# Patient Record
Sex: MALE | Race: BLACK OR AFRICAN AMERICAN | Employment: OTHER | URBAN - METROPOLITAN AREA
[De-identification: names, ages, dates, MRNs, and addresses within clinical notes are randomized per-mention and may not be internally consistent; named-entity substitution may affect disease eponyms.]

---

## 2017-01-01 ENCOUNTER — HOSPITAL ENCOUNTER (INPATIENT)
Age: 82
LOS: 2 days | DRG: 871 | End: 2017-01-04
Attending: INTERNAL MEDICINE | Admitting: FAMILY MEDICINE
Payer: MEDICARE

## 2017-01-01 ENCOUNTER — APPOINTMENT (OUTPATIENT)
Dept: GENERAL RADIOLOGY | Age: 82
DRG: 871 | End: 2017-01-01
Attending: INTERNAL MEDICINE
Payer: MEDICARE

## 2017-01-01 VITALS
BODY MASS INDEX: 17.04 KG/M2 | DIASTOLIC BLOOD PRESSURE: 30 MMHG | OXYGEN SATURATION: 67 % | HEART RATE: 47 BPM | WEIGHT: 115.08 LBS | HEIGHT: 69 IN | RESPIRATION RATE: 21 BRPM | TEMPERATURE: 94.8 F | SYSTOLIC BLOOD PRESSURE: 60 MMHG

## 2017-01-01 DIAGNOSIS — I48.91 ATRIAL FIBRILLATION WITH RVR (HCC): Primary | ICD-10-CM

## 2017-01-01 DIAGNOSIS — I95.0 IDIOPATHIC HYPOTENSION: ICD-10-CM

## 2017-01-01 LAB
ALBUMIN SERPL BCP-MCNC: 2.9 G/DL (ref 3.4–5)
ALBUMIN/GLOB SERPL: 0.6 {RATIO} (ref 0.8–1.7)
ALP SERPL-CCNC: 217 U/L (ref 45–117)
ALT SERPL-CCNC: 27 U/L (ref 16–61)
AMORPH CRY URNS QL MICRO: ABNORMAL
ANION GAP BLD CALC-SCNC: 12 MMOL/L (ref 3–18)
ANION GAP BLD CALC-SCNC: 18 MMOL/L (ref 3–18)
APPEARANCE UR: ABNORMAL
APTT PPP: 33.3 SEC (ref 23–36.4)
APTT PPP: >180 SEC (ref 23–36.4)
AST SERPL W P-5'-P-CCNC: 33 U/L (ref 15–37)
ATRIAL RATE: 141 BPM
BACTERIA URNS QL MICRO: ABNORMAL /HPF
BASOPHILS # BLD AUTO: 0 K/UL (ref 0–0.06)
BASOPHILS # BLD AUTO: 0 K/UL (ref 0–0.06)
BASOPHILS # BLD: 0 % (ref 0–2)
BASOPHILS # BLD: 0 % (ref 0–2)
BILIRUB DIRECT SERPL-MCNC: 0.3 MG/DL (ref 0–0.2)
BILIRUB SERPL-MCNC: 0.7 MG/DL (ref 0.2–1)
BILIRUB UR QL: NEGATIVE
BUN SERPL-MCNC: 76 MG/DL (ref 7–18)
BUN SERPL-MCNC: 76 MG/DL (ref 7–18)
BUN/CREAT SERPL: 16 (ref 12–20)
BUN/CREAT SERPL: 16 (ref 12–20)
CALCIUM SERPL-MCNC: 10.9 MG/DL (ref 8.5–10.1)
CALCIUM SERPL-MCNC: 9.8 MG/DL (ref 8.5–10.1)
CALCULATED R AXIS, ECG10: 96 DEGREES
CALCULATED T AXIS, ECG11: -102 DEGREES
CHLORIDE SERPL-SCNC: 98 MMOL/L (ref 100–108)
CHLORIDE SERPL-SCNC: 99 MMOL/L (ref 100–108)
CK MB CFR SERPL CALC: 3.8 % (ref 0–4)
CK MB CFR SERPL CALC: 4.1 % (ref 0–4)
CK MB CFR SERPL CALC: 5.3 % (ref 0–4)
CK MB SERPL-MCNC: 3.8 NG/ML (ref 0.5–3.6)
CK MB SERPL-MCNC: 4 NG/ML (ref 0.5–3.6)
CK MB SERPL-MCNC: 6.7 NG/ML (ref 0.5–3.6)
CK SERPL-CCNC: 101 U/L (ref 39–308)
CK SERPL-CCNC: 126 U/L (ref 39–308)
CK SERPL-CCNC: 98 U/L (ref 39–308)
CO2 SERPL-SCNC: 21 MMOL/L (ref 21–32)
CO2 SERPL-SCNC: 28 MMOL/L (ref 21–32)
COLOR UR: ABNORMAL
CREAT SERPL-MCNC: 4.79 MG/DL (ref 0.6–1.3)
CREAT SERPL-MCNC: 4.81 MG/DL (ref 0.6–1.3)
DIAGNOSIS, 93000: NORMAL
DIFFERENTIAL METHOD BLD: ABNORMAL
DIFFERENTIAL METHOD BLD: ABNORMAL
EOSINOPHIL # BLD: 0 K/UL (ref 0–0.4)
EOSINOPHIL # BLD: 0 K/UL (ref 0–0.4)
EOSINOPHIL NFR BLD: 0 % (ref 0–5)
EOSINOPHIL NFR BLD: 0 % (ref 0–5)
EPITH CASTS URNS QL MICRO: ABNORMAL /LPF (ref 0–5)
ERYTHROCYTE [DISTWIDTH] IN BLOOD BY AUTOMATED COUNT: 14.6 % (ref 11.6–14.5)
ERYTHROCYTE [DISTWIDTH] IN BLOOD BY AUTOMATED COUNT: 14.8 % (ref 11.6–14.5)
GLOBULIN SER CALC-MCNC: 4.5 G/DL (ref 2–4)
GLUCOSE BLD STRIP.AUTO-MCNC: 113 MG/DL (ref 70–110)
GLUCOSE BLD STRIP.AUTO-MCNC: 122 MG/DL (ref 70–110)
GLUCOSE SERPL-MCNC: 100 MG/DL (ref 74–99)
GLUCOSE SERPL-MCNC: 90 MG/DL (ref 74–99)
GLUCOSE UR STRIP.AUTO-MCNC: NEGATIVE MG/DL
HCT VFR BLD AUTO: 26.6 % (ref 36–48)
HCT VFR BLD AUTO: 29.4 % (ref 36–48)
HGB BLD-MCNC: 10 G/DL (ref 13–16)
HGB BLD-MCNC: 9.1 G/DL (ref 13–16)
HGB UR QL STRIP: NEGATIVE
INR PPP: 1.4 (ref 0.8–1.2)
KETONES UR QL STRIP.AUTO: ABNORMAL MG/DL
LACTATE BLD-SCNC: 2.2 MMOL/L (ref 0.4–2)
LACTATE BLD-SCNC: 2.9 MMOL/L (ref 0.4–2)
LACTATE BLD-SCNC: 6.8 MMOL/L (ref 0.4–2)
LACTATE BLD-SCNC: 6.9 MMOL/L (ref 0.4–2)
LACTATE SERPL-SCNC: 12.5 MMOL/L (ref 0.4–2)
LACTATE SERPL-SCNC: 13.6 MMOL/L (ref 0.4–2)
LACTATE SERPL-SCNC: 9.4 MMOL/L (ref 0.4–2)
LEUKOCYTE ESTERASE UR QL STRIP.AUTO: ABNORMAL
LIPASE SERPL-CCNC: 185 U/L (ref 73–393)
LYMPHOCYTES # BLD AUTO: 14 % (ref 21–52)
LYMPHOCYTES # BLD AUTO: 8 % (ref 21–52)
LYMPHOCYTES # BLD: 0.7 K/UL (ref 0.9–3.6)
LYMPHOCYTES # BLD: 1 K/UL (ref 0.9–3.6)
MAGNESIUM SERPL-MCNC: 2.3 MG/DL (ref 1.8–2.4)
MAGNESIUM SERPL-MCNC: 2.5 MG/DL (ref 1.8–2.4)
MCH RBC QN AUTO: 32.1 PG (ref 24–34)
MCH RBC QN AUTO: 32.6 PG (ref 24–34)
MCHC RBC AUTO-ENTMCNC: 34 G/DL (ref 31–37)
MCHC RBC AUTO-ENTMCNC: 34.2 G/DL (ref 31–37)
MCV RBC AUTO: 94.2 FL (ref 74–97)
MCV RBC AUTO: 95.3 FL (ref 74–97)
MIXED CELL CASTS URNS QL MICRO: ABNORMAL /LPF
MONOCYTES # BLD: 0.5 K/UL (ref 0.05–1.2)
MONOCYTES # BLD: 0.5 K/UL (ref 0.05–1.2)
MONOCYTES NFR BLD AUTO: 5 % (ref 3–10)
MONOCYTES NFR BLD AUTO: 7 % (ref 3–10)
NEUTS SEG # BLD: 5.5 K/UL (ref 1.8–8)
NEUTS SEG # BLD: 7.6 K/UL (ref 1.8–8)
NEUTS SEG NFR BLD AUTO: 79 % (ref 40–73)
NEUTS SEG NFR BLD AUTO: 87 % (ref 40–73)
NITRITE UR QL STRIP.AUTO: NEGATIVE
PH UR STRIP: 5 [PH] (ref 5–8)
PLATELET # BLD AUTO: 223 K/UL (ref 135–420)
PLATELET # BLD AUTO: 229 K/UL (ref 135–420)
PMV BLD AUTO: 10.2 FL (ref 9.2–11.8)
PMV BLD AUTO: 9.7 FL (ref 9.2–11.8)
POTASSIUM SERPL-SCNC: 3.7 MMOL/L (ref 3.5–5.5)
POTASSIUM SERPL-SCNC: 3.9 MMOL/L (ref 3.5–5.5)
PROT SERPL-MCNC: 7.4 G/DL (ref 6.4–8.2)
PROT UR STRIP-MCNC: 100 MG/DL
PROTHROMBIN TIME: 16.7 SEC (ref 11.5–15.2)
Q-T INTERVAL, ECG07: 316 MS
QRS DURATION, ECG06: 100 MS
QTC CALCULATION (BEZET), ECG08: 492 MS
RBC # BLD AUTO: 2.79 M/UL (ref 4.7–5.5)
RBC # BLD AUTO: 3.12 M/UL (ref 4.7–5.5)
RBC #/AREA URNS HPF: NEGATIVE /HPF (ref 0–5)
SODIUM SERPL-SCNC: 138 MMOL/L (ref 136–145)
SODIUM SERPL-SCNC: 138 MMOL/L (ref 136–145)
SP GR UR REFRACTOMETRY: 1.02 (ref 1–1.03)
TROPONIN I SERPL-MCNC: 0.65 NG/ML (ref 0–0.04)
TROPONIN I SERPL-MCNC: 0.69 NG/ML (ref 0–0.04)
TROPONIN I SERPL-MCNC: 0.89 NG/ML (ref 0–0.04)
TSH SERPL DL<=0.05 MIU/L-ACNC: 4.66 UIU/ML (ref 0.36–3.74)
UROBILINOGEN UR QL STRIP.AUTO: 0.2 EU/DL (ref 0.2–1)
VENTRICULAR RATE, ECG03: 146 BPM
WBC # BLD AUTO: 7 K/UL (ref 4.6–13.2)
WBC # BLD AUTO: 8.7 K/UL (ref 4.6–13.2)
WBC URNS QL MICRO: ABNORMAL /HPF (ref 0–4)

## 2017-01-01 PROCEDURE — 81001 URINALYSIS AUTO W/SCOPE: CPT | Performed by: FAMILY MEDICINE

## 2017-01-01 PROCEDURE — 74011000250 HC RX REV CODE- 250: Performed by: FAMILY MEDICINE

## 2017-01-01 PROCEDURE — 93005 ELECTROCARDIOGRAM TRACING: CPT

## 2017-01-01 PROCEDURE — 80048 BASIC METABOLIC PNL TOTAL CA: CPT | Performed by: INTERNAL MEDICINE

## 2017-01-01 PROCEDURE — 74011250636 HC RX REV CODE- 250/636: Performed by: FAMILY MEDICINE

## 2017-01-01 PROCEDURE — 65610000006 HC RM INTENSIVE CARE

## 2017-01-01 PROCEDURE — 87086 URINE CULTURE/COLONY COUNT: CPT | Performed by: FAMILY MEDICINE

## 2017-01-01 PROCEDURE — 74011250636 HC RX REV CODE- 250/636: Performed by: HOSPITALIST

## 2017-01-01 PROCEDURE — 85730 THROMBOPLASTIN TIME PARTIAL: CPT | Performed by: PHYSICIAN ASSISTANT

## 2017-01-01 PROCEDURE — 71010 XR CHEST PORT: CPT

## 2017-01-01 PROCEDURE — 77030034850

## 2017-01-01 PROCEDURE — 83605 ASSAY OF LACTIC ACID: CPT

## 2017-01-01 PROCEDURE — 74011000258 HC RX REV CODE- 258: Performed by: FAMILY MEDICINE

## 2017-01-01 PROCEDURE — 99285 EMERGENCY DEPT VISIT HI MDM: CPT

## 2017-01-01 PROCEDURE — 96361 HYDRATE IV INFUSION ADD-ON: CPT

## 2017-01-01 PROCEDURE — 83735 ASSAY OF MAGNESIUM: CPT | Performed by: INTERNAL MEDICINE

## 2017-01-01 PROCEDURE — 85610 PROTHROMBIN TIME: CPT | Performed by: INTERNAL MEDICINE

## 2017-01-01 PROCEDURE — 93306 TTE W/DOPPLER COMPLETE: CPT

## 2017-01-01 PROCEDURE — 83605 ASSAY OF LACTIC ACID: CPT | Performed by: FAMILY MEDICINE

## 2017-01-01 PROCEDURE — L4396 STATIC OR DYNAMI AFO PRE CST: HCPCS

## 2017-01-01 PROCEDURE — 87040 BLOOD CULTURE FOR BACTERIA: CPT | Performed by: FAMILY MEDICINE

## 2017-01-01 PROCEDURE — 85730 THROMBOPLASTIN TIME PARTIAL: CPT | Performed by: INTERNAL MEDICINE

## 2017-01-01 PROCEDURE — 82550 ASSAY OF CK (CPK): CPT | Performed by: INTERNAL MEDICINE

## 2017-01-01 PROCEDURE — 36592 COLLECT BLOOD FROM PICC: CPT | Performed by: NURSE PRACTITIONER

## 2017-01-01 PROCEDURE — 92610 EVALUATE SWALLOWING FUNCTION: CPT

## 2017-01-01 PROCEDURE — 77030011256 HC DRSG MEPILEX <16IN NO BORD MOLN -A

## 2017-01-01 PROCEDURE — 51702 INSERT TEMP BLADDER CATH: CPT

## 2017-01-01 PROCEDURE — 74011000250 HC RX REV CODE- 250: Performed by: INTERNAL MEDICINE

## 2017-01-01 PROCEDURE — 74011250636 HC RX REV CODE- 250/636: Performed by: PHYSICIAN ASSISTANT

## 2017-01-01 PROCEDURE — 77030033263 HC DRSG MEPILEX 16-48IN BORD MOLN -B

## 2017-01-01 PROCEDURE — 87186 SC STD MICRODIL/AGAR DIL: CPT | Performed by: INTERNAL MEDICINE

## 2017-01-01 PROCEDURE — 87077 CULTURE AEROBIC IDENTIFY: CPT | Performed by: INTERNAL MEDICINE

## 2017-01-01 PROCEDURE — C9113 INJ PANTOPRAZOLE SODIUM, VIA: HCPCS | Performed by: FAMILY MEDICINE

## 2017-01-01 PROCEDURE — 83605 ASSAY OF LACTIC ACID: CPT | Performed by: HOSPITALIST

## 2017-01-01 PROCEDURE — 87040 BLOOD CULTURE FOR BACTERIA: CPT | Performed by: NURSE PRACTITIONER

## 2017-01-01 PROCEDURE — 83690 ASSAY OF LIPASE: CPT | Performed by: INTERNAL MEDICINE

## 2017-01-01 PROCEDURE — 74011000258 HC RX REV CODE- 258: Performed by: HOSPITALIST

## 2017-01-01 PROCEDURE — 76450000000

## 2017-01-01 PROCEDURE — 84443 ASSAY THYROID STIM HORMONE: CPT | Performed by: INTERNAL MEDICINE

## 2017-01-01 PROCEDURE — 96374 THER/PROPH/DIAG INJ IV PUSH: CPT

## 2017-01-01 PROCEDURE — 85025 COMPLETE CBC W/AUTO DIFF WBC: CPT | Performed by: INTERNAL MEDICINE

## 2017-01-01 PROCEDURE — 82962 GLUCOSE BLOOD TEST: CPT

## 2017-01-01 PROCEDURE — 80076 HEPATIC FUNCTION PANEL: CPT | Performed by: INTERNAL MEDICINE

## 2017-01-01 PROCEDURE — 77030022643 HC PAD DEFIB AD HRTSTRT PHL -B

## 2017-01-01 PROCEDURE — 77030013797 HC KT TRNSDUC PRSSR EDWD -A

## 2017-01-01 PROCEDURE — 74011250636 HC RX REV CODE- 250/636

## 2017-01-01 PROCEDURE — 84484 ASSAY OF TROPONIN QUANT: CPT | Performed by: INTERNAL MEDICINE

## 2017-01-01 PROCEDURE — 74011250636 HC RX REV CODE- 250/636: Performed by: INTERNAL MEDICINE

## 2017-01-01 PROCEDURE — 96375 TX/PRO/DX INJ NEW DRUG ADDON: CPT

## 2017-01-01 RX ORDER — CALCITRIOL 0.25 UG/1
0.25 CAPSULE ORAL DAILY
Status: DISCONTINUED | OUTPATIENT
Start: 2017-01-01 | End: 2017-01-01

## 2017-01-01 RX ORDER — AMIODARONE HYDROCHLORIDE 150 MG/3ML
150 INJECTION, SOLUTION INTRAVENOUS
Status: COMPLETED | OUTPATIENT
Start: 2017-01-01 | End: 2017-01-01

## 2017-01-01 RX ORDER — ALLOPURINOL 300 MG/1
300 TABLET ORAL DAILY
Status: DISCONTINUED | OUTPATIENT
Start: 2017-01-01 | End: 2017-01-01

## 2017-01-01 RX ORDER — AMIODARONE HYDROCHLORIDE 150 MG/3ML
150 INJECTION, SOLUTION INTRAVENOUS
Status: DISCONTINUED | OUTPATIENT
Start: 2017-01-01 | End: 2017-01-01

## 2017-01-01 RX ORDER — SODIUM CHLORIDE 0.9 % (FLUSH) 0.9 %
5-10 SYRINGE (ML) INJECTION AS NEEDED
Status: DISCONTINUED | OUTPATIENT
Start: 2017-01-01 | End: 2017-01-01 | Stop reason: HOSPADM

## 2017-01-01 RX ORDER — DILTIAZEM HYDROCHLORIDE 5 MG/ML
5 INJECTION INTRAVENOUS
Status: COMPLETED | OUTPATIENT
Start: 2017-01-01 | End: 2017-01-01

## 2017-01-01 RX ORDER — SODIUM CHLORIDE 0.9 % (FLUSH) 0.9 %
5-10 SYRINGE (ML) INJECTION EVERY 8 HOURS
Status: DISCONTINUED | OUTPATIENT
Start: 2017-01-01 | End: 2017-01-01 | Stop reason: HOSPADM

## 2017-01-01 RX ORDER — LEVOTHYROXINE SODIUM 25 UG/1
50 TABLET ORAL
Status: DISCONTINUED | OUTPATIENT
Start: 2017-01-01 | End: 2017-01-01 | Stop reason: HOSPADM

## 2017-01-01 RX ORDER — HEPARIN SODIUM 5000 [USP'U]/ML
5000 INJECTION, SOLUTION INTRAVENOUS; SUBCUTANEOUS EVERY 8 HOURS
Status: DISCONTINUED | OUTPATIENT
Start: 2017-01-01 | End: 2017-01-01

## 2017-01-01 RX ORDER — HEPARIN SODIUM 10000 [USP'U]/100ML
18 INJECTION, SOLUTION INTRAVENOUS
Status: DISCONTINUED | OUTPATIENT
Start: 2017-01-01 | End: 2017-01-01 | Stop reason: HOSPADM

## 2017-01-01 RX ORDER — NOREPINEPHRINE BITARTRATE/D5W 8 MG/250ML
2-30 PLASTIC BAG, INJECTION (ML) INTRAVENOUS
Status: DISCONTINUED | OUTPATIENT
Start: 2017-01-01 | End: 2017-01-01 | Stop reason: HOSPADM

## 2017-01-01 RX ORDER — HEPARIN SODIUM 1000 [USP'U]/ML
80 INJECTION, SOLUTION INTRAVENOUS; SUBCUTANEOUS ONCE
Status: COMPLETED | OUTPATIENT
Start: 2017-01-01 | End: 2017-01-01

## 2017-01-01 RX ORDER — LEVOFLOXACIN 5 MG/ML
500 INJECTION, SOLUTION INTRAVENOUS
Status: DISCONTINUED | OUTPATIENT
Start: 2017-01-01 | End: 2017-01-01 | Stop reason: HOSPADM

## 2017-01-01 RX ORDER — MELATONIN
3000 DAILY
Status: DISCONTINUED | OUTPATIENT
Start: 2017-01-01 | End: 2017-01-01

## 2017-01-01 RX ORDER — DILTIAZEM HYDROCHLORIDE 5 MG/ML
5 INJECTION INTRAVENOUS ONCE
Status: DISCONTINUED | OUTPATIENT
Start: 2017-01-01 | End: 2017-01-01

## 2017-01-01 RX ORDER — HEPARIN SODIUM 10000 [USP'U]/100ML
INJECTION, SOLUTION INTRAVENOUS
Status: COMPLETED
Start: 2017-01-01 | End: 2017-01-01

## 2017-01-01 RX ORDER — SODIUM CHLORIDE 9 MG/ML
250 INJECTION, SOLUTION INTRAVENOUS
Status: COMPLETED | OUTPATIENT
Start: 2017-01-01 | End: 2017-01-01

## 2017-01-01 RX ORDER — SIMVASTATIN 20 MG/1
40 TABLET, FILM COATED ORAL
Status: DISCONTINUED | OUTPATIENT
Start: 2017-01-01 | End: 2017-01-01 | Stop reason: HOSPADM

## 2017-01-01 RX ORDER — CINACALCET 30 MG/1
30 TABLET, FILM COATED ORAL DAILY
Status: DISCONTINUED | OUTPATIENT
Start: 2017-01-01 | End: 2017-01-01

## 2017-01-01 RX ADMIN — Medication 10 ML: at 10:06

## 2017-01-01 RX ADMIN — SODIUM CHLORIDE 40 MG: 9 INJECTION INTRAMUSCULAR; INTRAVENOUS; SUBCUTANEOUS at 14:13

## 2017-01-01 RX ADMIN — Medication 10 MCG/MIN: at 02:21

## 2017-01-01 RX ADMIN — Medication 10 ML: at 14:17

## 2017-01-01 RX ADMIN — PIPERACILLIN AND TAZOBACTAM 3.38 G: 3; .375 INJECTION, POWDER, FOR SOLUTION INTRAVENOUS at 02:11

## 2017-01-01 RX ADMIN — SODIUM CHLORIDE 500 ML: 900 INJECTION, SOLUTION INTRAVENOUS at 17:23

## 2017-01-01 RX ADMIN — DILTIAZEM HYDROCHLORIDE 5 MG: 5 INJECTION INTRAVENOUS at 18:28

## 2017-01-01 RX ADMIN — PIPERACILLIN AND TAZOBACTAM 3.38 G: 3; .375 INJECTION, POWDER, FOR SOLUTION INTRAVENOUS at 14:13

## 2017-01-01 RX ADMIN — HEPARIN SODIUM AND DEXTROSE 939.6 UNITS/HR: 10000; 5 INJECTION INTRAVENOUS at 11:40

## 2017-01-01 RX ADMIN — PIPERACILLIN SODIUM,TAZOBACTAM SODIUM 2.25 G: 2; .25 INJECTION, POWDER, FOR SOLUTION INTRAVENOUS at 08:23

## 2017-01-01 RX ADMIN — AMIODARONE HYDROCHLORIDE 0.5 MG/MIN: 1.8 INJECTION, SOLUTION INTRAVENOUS at 01:02

## 2017-01-01 RX ADMIN — LEVOFLOXACIN 500 MG: 5 INJECTION, SOLUTION INTRAVENOUS at 21:31

## 2017-01-01 RX ADMIN — HEPARIN SODIUM 4180 UNITS: 1000 INJECTION, SOLUTION INTRAVENOUS; SUBCUTANEOUS at 10:42

## 2017-01-01 RX ADMIN — PIPERACILLIN SODIUM,TAZOBACTAM SODIUM 2.25 G: 2; .25 INJECTION, POWDER, FOR SOLUTION INTRAVENOUS at 22:40

## 2017-01-01 RX ADMIN — Medication 2 MCG/MIN: at 21:11

## 2017-01-01 RX ADMIN — Medication 10 ML: at 01:02

## 2017-01-01 RX ADMIN — AMIODARONE HYDROCHLORIDE 1 MG/MIN: 1.8 INJECTION, SOLUTION INTRAVENOUS at 19:23

## 2017-01-01 RX ADMIN — AMIODARONE HYDROCHLORIDE 150 MG: 50 INJECTION, SOLUTION INTRAVENOUS at 19:06

## 2017-01-01 RX ADMIN — SODIUM CHLORIDE 1000 MG: 900 INJECTION, SOLUTION INTRAVENOUS at 22:11

## 2017-01-01 RX ADMIN — Medication 10 ML: at 10:05

## 2017-01-01 RX ADMIN — HEPARIN SODIUM 939.6 UNITS/HR: 10000 INJECTION, SOLUTION INTRAVENOUS at 11:40

## 2017-01-01 RX ADMIN — SODIUM CHLORIDE 1000 ML: 900 INJECTION, SOLUTION INTRAVENOUS at 18:28

## 2017-01-01 RX ADMIN — SODIUM CHLORIDE 250 ML: 900 INJECTION, SOLUTION INTRAVENOUS at 19:49

## 2017-01-01 RX ADMIN — AMIODARONE HYDROCHLORIDE 0.5 MG/MIN: 1.8 INJECTION, SOLUTION INTRAVENOUS at 01:39

## 2017-01-01 RX ADMIN — HEPARIN SODIUM 5000 UNITS: 5000 INJECTION, SOLUTION INTRAVENOUS; SUBCUTANEOUS at 08:21

## 2017-01-02 PROBLEM — I48.91 ATRIAL FIBRILLATION WITH RVR (HCC): Status: ACTIVE | Noted: 2017-01-01

## 2017-01-02 PROBLEM — A41.9 SEPSIS (HCC): Status: ACTIVE | Noted: 2017-01-01

## 2017-01-02 PROBLEM — I95.9 HYPOTENSION: Status: ACTIVE | Noted: 2017-01-01

## 2017-01-02 NOTE — ED NOTES
Blood pressure needs to be taken on the legs because patient has a fistula in the left arm and a PICC line in the right arm. Accurate BP readings have been difficult to obtain.  Also we have tried Pulse ox on both sided fingers and both sided ears (it is reading in and out)

## 2017-01-02 NOTE — ED NOTES
Spoke with Granddaughter on phone (also POA), Jaylan Garzon. Her number is [phone] and she would like to be called if anything changes. She confirmed that patient is a Full Code.

## 2017-01-02 NOTE — ED TRIAGE NOTES
Per EMS pt comes from dialysis because his blood pressure was to low to start his treatment. Pt denies any pain and per EMS BP in the  Field was 100/72.

## 2017-01-02 NOTE — ED PROVIDER NOTES
HPI Comments: 4:09 PM Kj Vides is a 80 y.o. male with a history of kidney disease and HTN who presents to ED c/o a decreased blood pressure. Pt has dementia and is a poor historian. Per the EMS, the pt is coming from the dialysis center because his blood pressure was to low to start his treatment and was brought to the ED for further evaluation. Per the RN at the dialysis center, the pt came for dialysis but before it was begun, he was noted to have a BP reading as 92/64, HR of 146, and being SOB. They called Dr Stanton Sweeney, who told them to bring him to the ER. Pt denies having any pain any where. No other concerns at this time. PCP: Marlo Rosenbaum MD        Patient is a 80 y.o. male presenting with other event. The history is provided by the patient. Other   Associated symptoms include abdominal pain and shortness of breath. Pertinent negatives include no chest pain and no headaches. Past Medical History:   Diagnosis Date    Acute gout     Blind right eye     Cardiomyopathy ([address]     Gout     HTN (hypertension)     Hyperlipemia     Kidney disease        Past Surgical History:   Procedure Laterality Date    Hx hernia repair           No family history on file. Social History     Social History    Marital status:      Spouse name: N/A    Number of children: N/A    Years of education: N/A     Occupational History    Not on file. Social History Main Topics    Smoking status: Former Smoker    Smokeless tobacco: Not on file    Alcohol use No    Drug use: No    Sexual activity: Not on file     Other Topics Concern    Not on file     Social History Narrative         ALLERGIES: Review of patient's allergies indicates no known allergies. Review of Systems   Constitutional: Negative for appetite change, chills, diaphoresis and fatigue. HENT: Negative for ear pain, facial swelling, hearing loss, nosebleeds, sneezing, sore throat and tinnitus.     Eyes: Negative for photophobia, pain, discharge, redness, itching and visual disturbance. Respiratory: Positive for shortness of breath. Negative for apnea, cough, choking, chest tightness, wheezing and stridor. Cardiovascular: Negative for chest pain, palpitations and leg swelling. Pt had a decreased BP. Gastrointestinal: Positive for abdominal pain. Negative for abdominal distention, anal bleeding, blood in stool, constipation, diarrhea, nausea, rectal pain and vomiting. Endocrine: Negative for heat intolerance, polydipsia, polyphagia and polyuria. Genitourinary: Negative for decreased urine volume, dysuria, enuresis, flank pain, frequency, genital sores and hematuria. Musculoskeletal: Negative for back pain, gait problem, joint swelling, myalgias and neck pain. Skin: Negative for color change, pallor, rash and wound. Allergic/Immunologic: Negative for environmental allergies and food allergies. Neurological: Negative for dizziness, tremors, syncope, facial asymmetry, speech difficulty, light-headedness, numbness and headaches. Hematological: Negative for adenopathy. Does not bruise/bleed easily. Psychiatric/Behavioral: Negative for decreased concentration, dysphoric mood, self-injury, sleep disturbance and suicidal ideas. The patient is not nervous/anxious and is not hyperactive. All other systems reviewed and are negative. Vitals:    01/02/17 1815 01/02/17 1819 01/02/17 1820 01/02/17 1830   BP:  130/78 105/62 108/61   Pulse: (!) 149 (!) 144 (!) 142 (!) 151   Resp: 21 19 29 23   Temp:       SpO2:       Weight:                Physical Exam   Constitutional: He is oriented to person, place, and time. He appears well-developed and well-nourished. HENT:   Head: Normocephalic. Mouth/Throat: No oropharyngeal exudate. Eyes: Pupils are equal, round, and reactive to light. Neck: Normal range of motion. Neck supple. Cardiovascular: Normal rate, normal heart sounds and intact distal pulses.   An irregularly irregular rhythm present. Exam reveals no gallop and no friction rub. No murmur heard. No JVD   Pulmonary/Chest: Effort normal. No respiratory distress. He has decreased breath sounds (at the bases). He has no wheezes. He has no rales. He exhibits no tenderness. Decreased breath sounds right base   Abdominal: Soft. Bowel sounds are normal. He exhibits no distension. There is no tenderness. There is no rebound and no guarding. Musculoskeletal: Normal range of motion. He exhibits no edema or tenderness. PICC line in right arm and dialysis fistula on left arm with thrill   Neurological: He is alert and oriented to person, place, and time. Skin: Skin is warm and dry. No rash noted. He is not diaphoretic. No erythema. Psychiatric: He has a normal mood and affect. Nursing note and vitals reviewed. [facility]  ED Course       Procedures    Vitals:  Patient Vitals for the past 12 hrs:   Temp Pulse Resp BP SpO2   01/02/17 1830 - (!) 151 23 108/61 -   01/02/17 1820 - (!) 142 29 105/62 -   01/02/17 1819 - (!) 144 19 130/78 -   01/02/17 1815 - (!) 149 21 - -   01/02/17 1745 - (!) 149 21 (!) 64/40 97 %   01/02/17 1735 - (!) 142 17 - 99 %   01/02/17 1730 - (!) 149 20 (!) 53/28 100 %   01/02/17 1715 98.1 °F (36.7 °C) - - - -   01/02/17 1700 - (!) 145 (!) 31 (!) 108/93 (!) 87 %   01/02/17 1628 - (!) 147 23 (!) 79/42 -   01/02/17 1600 - (!) 144 21 102/66 -   01/02/17 1559 - - - - 100 %   01/02/17 1553 96.8 °F (36 °C) (!) 144 24 108/75 -   01/02/17 1545 - - - 108/75 -   100% on RA, indicating adequate oxygenation.       Medications ordered:   Medications   sodium chloride 0.9 % bolus infusion 1,000 mL (1,000 mL IntraVENous New Bag 1/2/17 1828)   dilTIAZem (CARDIZEM) injection 5 mg (not administered)   amiodarone (NEXTERONE) 360 mg in dextrose 200 mL (1.8 mg/mL) infusion (not administered)   amiodarone (CORDARONE) injection 150 mg (not administered)   sodium chloride 0.9 % bolus infusion 500 mL (0 mL IntraVENous IV Completed 1/2/17 4254)   dilTIAZem (CARDIZEM) injection 5 mg (5 mg IntraVENous Given 1/2/17 2871)         Lab findings:  Recent Results (from the past 12 hour(s))   EKG, 12 LEAD, INITIAL    Collection Time: 01/02/17  4:29 PM   Result Value Ref Range    Ventricular Rate 146 BPM    Atrial Rate 141 BPM    QRS Duration 100 ms    Q-T Interval 316 ms    QTC Calculation (Bezet) 492 ms    Calculated R Axis 96 degrees    Calculated T Axis -102 degrees    Diagnosis       Undetermined rhythm  Rightward axis  Low voltage QRS  Septal infarct , age undetermined  ST & T wave abnormality, consider inferolateral ischemia  Abnormal ECG  When compared with ECG of 06-SEP-2016 15:38,  Current undetermined rhythm precludes rhythm comparison, needs review  Septal infarct is now present     PROTHROMBIN TIME + INR    Collection Time: 01/02/17  4:50 PM   Result Value Ref Range    Prothrombin time 16.7 (H) 11.5 - 15.2 sec    INR 1.4 (H) 0.8 - 1.2     PTT    Collection Time: 01/02/17  4:50 PM   Result Value Ref Range    aPTT 33.3 23.0 - 36.4 SEC   CBC WITH AUTOMATED DIFF    Collection Time: 01/02/17  4:50 PM   Result Value Ref Range    WBC 7.0 4.6 - 13.2 K/uL    RBC 3.12 (L) 4.70 - 5.50 M/uL    HGB 10.0 (L) 13.0 - 16.0 g/dL    HCT 29.4 (L) 36.0 - 48.0 %    MCV 94.2 74.0 - 97.0 FL    MCH 32.1 24.0 - 34.0 PG    MCHC 34.0 31.0 - 37.0 g/dL    RDW 14.6 (H) 11.6 - 14.5 %    PLATELET 222 114 - 177 K/uL    MPV 9.7 9.2 - 11.8 FL    NEUTROPHILS 79 (H) 40 - 73 %    LYMPHOCYTES 14 (L) 21 - 52 %    MONOCYTES 7 3 - 10 %    EOSINOPHILS 0 0 - 5 %    BASOPHILS 0 0 - 2 %    ABS. NEUTROPHILS 5.5 1.8 - 8.0 K/UL    ABS. LYMPHOCYTES 1.0 0.9 - 3.6 K/UL    ABS. MONOCYTES 0.5 0.05 - 1.2 K/UL    ABS. EOSINOPHILS 0.0 0.0 - 0.4 K/UL    ABS.  BASOPHILS 0.0 0.0 - 0.06 K/UL    DF AUTOMATED     METABOLIC PANEL, BASIC    Collection Time: 01/02/17  4:50 PM   Result Value Ref Range    Sodium 138 136 - 145 mmol/L    Potassium 3.9 3.5 - 5.5 mmol/L    Chloride 98 (L) 100 - 108 mmol/L    CO2 28 21 - 32 mmol/L    Anion gap 12 3.0 - 18 mmol/L    Glucose 90 74 - 99 mg/dL    BUN 76 (H) 7.0 - 18 MG/DL    Creatinine 4.79 (H) 0.6 - 1.3 MG/DL    BUN/Creatinine ratio 16 12 - 20      GFR est AA 14 (L) >60 ml/min/1.73m2    GFR est non-AA 11 (L) >60 ml/min/1.73m2    Calcium 10.9 (H) 8.5 - 10.1 MG/DL   CARDIAC PANEL,(CK, CKMB & TROPONIN)    Collection Time: 01/02/17  4:50 PM   Result Value Ref Range     39 - 308 U/L    CK - MB 3.8 (H) 0.5 - 3.6 ng/ml    CK-MB Index 3.8 0.0 - 4.0 %    Troponin-I, Qt. 0.65 (H) 0.0 - 0.045 NG/ML   LIPASE    Collection Time: 01/02/17  4:50 PM   Result Value Ref Range    Lipase 185 73 - 393 U/L   HEPATIC FUNCTION PANEL    Collection Time: 01/02/17  4:50 PM   Result Value Ref Range    Protein, total 7.4 6.4 - 8.2 g/dL    Albumin 2.9 (L) 3.4 - 5.0 g/dL    Globulin 4.5 (H) 2.0 - 4.0 g/dL    A-G Ratio 0.6 (L) 0.8 - 1.7      Bilirubin, total 0.7 0.2 - 1.0 MG/DL    Bilirubin, direct 0.3 (H) 0.0 - 0.2 MG/DL    Alk. phosphatase 217 (H) 45 - 117 U/L    AST 33 15 - 37 U/L    ALT 27 16 - 61 U/L   MAGNESIUM    Collection Time: 01/02/17  4:50 PM   Result Value Ref Range    Magnesium 2.5 (H) 1.8 - 2.4 mg/dL   POC LACTIC ACID    Collection Time: 01/02/17  4:52 PM   Result Value Ref Range    Lactic Acid (POC) 2.2 (HH) 0.4 - 2.0 mmol/L       EKG interpretation by ED Physician:    X-Ray, CT or other radiology findings or impressions:  XR CHEST PORT    (Final Result)  Independently interpreted by Christo Devine MD which shows: Heart failure, right sided pleural effusion. Progress notes, Consult notes or additional Procedure notes:   5:18 PM. Performed a bedside echo that showed poor heart activity. Consult:  Discussed care with Dr. Linette Nam, Cardiology Standard discussion; including history of patients chief complaint, available diagnostic results, and treatment course. Recommends pt admission.     7:05 PM Consult:  Discussed care with Dr. Alysia Wilhelm, hospitalist. Standard discussion; including history of patients chief complaint, available diagnostic results, and treatment course. Recommends I speak with cardiologist on-call for admission because he is not sure that amiodarone is indicated since it might convert him to nsr and throw embolus. 7:22 PM Case discussed with Dr Shila Beltre; cardiology; he recommends use of amiodarone also. Dr Michael Amaro called and informed; agrees with admission    CLINICAL IMPRESSION  CLINICAL IMPRESSION    1. Atrial fibrillation with RVR ([address]    2. Idiopathic hypotension        Disposition: admission    Follow-up Information     None           Patient's Medications   Start Taking    No medications on file   Continue Taking    ALLOPURINOL (ZYLOPRIM) 300 MG TABLET    300 mg. CALCITRIOL (ROCALTROL) 0.25 MCG CAPSULE    Take 0.25 mcg by mouth daily. CHOLECALCIFEROL, VITAMIN D3, 3,000 UNIT TAB    Take  by mouth. CINACALCET (SENSIPAR) 30 MG TABLET    Take 30 mg by mouth daily. LEVOTHYROXINE (SYNTHROID) 50 MCG TABLET    Take  by mouth Daily (before breakfast). SIMVASTATIN (ZOCOR) 40 MG TABLET    40 mg. These Medications have changed    No medications on file   Stop Taking    No medications on file         Scribe Attestation:   [employer] acting as a scribe for and in the presence of Dr. Akiko Herring MD January 02, 2017 at 4:06 PM     Signed by: [employer], Scribe, January 02, 2017, 4:06 PM    Provider Attestation:   I personally performed the services described in the documentation, reviewed the documentation, as recorded by the scribe in my presence, and it accurately and completely records my words and actions.      Reviewed and signed by:  Dr. Akiko Herring MD

## 2017-01-03 PROBLEM — E87.20 LACTIC ACIDOSIS: Status: ACTIVE | Noted: 2017-01-01

## 2017-01-03 PROBLEM — R57.9 SHOCK (HCC): Status: ACTIVE | Noted: 2017-01-01

## 2017-01-03 NOTE — ED NOTES
Patient repositioned and pulled up in bed. Purposeful rounding completed:    Side rails up x 2:  YES  Bed in low position and wheels locked: YES  Call bell within reach: YES  Comfort addressed: YES    Toileting needs addressed: YES  Plan of care reviewed/updated with patient and or family members: YES  IV site assessed: YES  Pain assessed and addressed: YES.

## 2017-01-03 NOTE — CONSULTS
Consult Note    Assessment:     1. ESRD. Moderately hypervolemic. Stable electrolytes. 2. Hypotension due to sepsis/a.fib/chf.   3. A. Fib with rvr. On amiodarone drip/heparin. 4. Systolic chf.   5. Bl feet gangrene with sepsis. Vascular on the case. 6. Lactic acidosis. 7. Anemia of ESRD. H/H is below goal.   6. Secondary hyperparathyroidism of ESRD. 7. Debilitation/malnutrtition. 8. Altered ms on likely baseline dementia. Recommendations:   1. Will hold off on dialysis given hemodynamic instability. Will likely dialyze tomorrow. 2. Continue pressors. 3. Continue empiric abx. 4. Epo with dialysis. 5. Hold sensipar and phos binder at this time. 6. Agree with plans for palliative care consult. 7. Avoid Gadolinium due to its association with nephrogenic systemic fibrosis in a patients with severe ARF and ESRD. 8. Please dose all medications for  creatinine clearance <15/dialysis. 9. Protect left  arm from blood pressure checks, blood draws, peripheral iv's. Consult requested by: Guillermina Laurent MD    ADMIT DATE: 1/2/2017  CONSULT DATE: January 3, 2017                 Admission diagnosis: Shock [facility])   Reason for Nephrology Consultation: Management of ESRD  HPI: Osiel Wright is a 80 y.o. male [address] with known diagnosis of htn, pad and  ESRD for which he receives hemodialysis on MWF  schedule at [facility]. Patient's nephrologist is Dr. Caterina Rico. Lt arm avg is used for dialysis access. Patient is debilitated and resides at [facility]. Patient presented to [facility] ED from dialysis unit due to hypotension and decrease in mental status. H/o is based on medical records and on talking to Dr. Caterina Rico. No dialysis was done yesterday. In ED patient was in A.fib with rvr. Had hypotension with cardizem drip, changed to amiodarone. Received ivf and was started on pressors, admitted to icu. He is on heparin drip for a. Fib and pvd with bl feet gangrene. Preliminary echo with low ef. Started on abx. Past Medical History   Diagnosis Date    Acute gout     Blind right eye     Cardiomyopathy ([address]     ESRD on hemodialysis (HCC)      Vicky greenwood Dr. Ilsa Shillings Gout     HTN (hypertension)     Hyperlipemia       Past Surgical History   Procedure Laterality Date    Hx hernia repair         Social History     Social History    Marital status:      Spouse name: N/A    Number of children: N/A    Years of education: N/A     Occupational History    Not on file. Social History Main Topics    Smoking status: Former Smoker    Smokeless tobacco: Not on file    Alcohol use No    Drug use: No    Sexual activity: Not on file     Other Topics Concern    Not on file     Social History Narrative       No family history on file. No Known Allergies     Home Medications:     Prescriptions Prior to Admission   Medication Sig    calcitRIOL (ROCALTROL) 0.25 mcg capsule Take 0.25 mcg by mouth daily.  Cholecalciferol, Vitamin D3, 3,000 unit tab Take  by mouth.  cinacalcet (SENSIPAR) 30 mg tablet Take 30 mg by mouth daily.  levothyroxine (SYNTHROID) 50 mcg tablet Take  by mouth Daily (before breakfast).  allopurinol (ZYLOPRIM) 300 mg tablet 300 mg.     simvastatin (ZOCOR) 40 mg tablet 40 mg.       Current Inpatient Medications:     Current Facility-Administered Medications   Medication Dose Route Frequency    sodium chloride (NS) flush 5-10 mL  5-10 mL IntraVENous Q8H    sodium chloride (NS) flush 5-10 mL  5-10 mL IntraVENous PRN    allopurinol (ZYLOPRIM) tablet 300 mg  300 mg Oral DAILY    cinacalcet (SENSIPAR) tablet 30 mg  30 mg Oral DAILY    [START ON 1/4/2017] levothyroxine (SYNTHROID) tablet 50 mcg  50 mcg Oral ACB    simvastatin (ZOCOR) tablet 40 mg  40 mg Oral QHS    calcitRIOL (ROCALTROL) capsule 0.25 mcg  0.25 mcg Oral DAILY    cholecalciferol (VITAMIN D3) tablet 3,000 Units  3,000 Units Oral DAILY    amiodarone (NEXTERONE) 360 mg in dextrose 200 mL (1.8 mg/mL) infusion  0.5-1 mg/min IntraVENous TITRATE    pantoprazole (PROTONIX) 40 mg in sodium chloride 0.9 % 10 mL injection  40 mg IntraVENous DAILY    heparin 25,000 units in D5W 250 ml infusion  18 Units/kg/hr IntraVENous TITRATE    piperacillin-tazobactam (ZOSYN) 2.25 g in 0.9% sodium chloride (MBP/ADV) 50 mL MBP  2.25 g IntraVENous Q8H    levoFLOXacin (LEVAQUIN) 500 mg in D5W IVPB  500 mg IntraVENous Q48H    NOREPINephrine (LEVOPHED) 8,000 mcg in dextrose 5% 250 mL infusion  2-30 mcg/min IntraVENous TITRATE       Review of Systems:   Unobtainable. Physical Assessment:     Vitals:    01/03/17 1015 01/03/17 1045 01/03/17 1100 01/03/17 1115   BP: 109/52 107/84 106/60 92/69   Pulse: (!) 111 (!) 111 (!) 114 (!) 111   Resp: 26 24 24 25   Temp:       SpO2:       Weight:       Height:         Last 3 Recorded Weights in this Encounter    01/02/17 1715 01/03/17 1000   Weight: 52.2 kg (115 lb) 52.2 kg (115 lb 1.3 oz)     Admission weight: Weight: 52.2 kg (115 lb) (01/02/17 1715)      Intake/Output Summary (Last 24 hours) at 01/03/17 1233  Last data filed at 01/03/17 1000   Gross per 24 hour   Intake           225.16 ml   Output                0 ml   Net           225.16 ml       Patient is in no apparent distress. HEENT: Head is normocephalic and atraumatic. Sclerae are anicteric. Oral mucosa is dry. Neck: no cervical lymphadenopathy or thyromegaly. Lungs: good air entry, bl exp rhonchi. Trachea at the midline. Cardiovascular system: S1, S2, irregular rate and rhythm. Soft syst murmur. Pos jvd. Carotid upstroke 1 + bilaterally. Abdomen: soft, non tender, non distended. Positive bowel sounds. No hepatosplenomegaly. No abdominal bruits. Extremities: no clubbing, cyanosis. 1+ bl pretibial edema. Feet are cold. 2nd toes bl are gangrenous. Bl heel dressings intact. Neurologic: Alert, states he is \"ok\", doesn't follow commands. Dialysis access: lt arm avg. Data Review:    Labs: Results:       Chemistry Recent Labs      01/03/17   0330  01/02/17   1650   GLU  100*  90   NA  138  138   K  3.7  3.9   CL  99*  98*   CO2  21  28   BUN  76*  76*   CREA  4.81*  4.79*   CA  9.8  10.9*   AGAP  18  12   BUCR  16  16   AP   --   217*   TP   --   7.4   ALB   --   2.9*   GLOB   --   4.5*   AGRAT   --   0.6*         CBC w/Diff Recent Labs      01/03/17   0330  01/02/17   1650   WBC  8.7  7.0   RBC  2.79*  3.12*   HGB  9.1*  10.0*   HCT  26.6*  29.4*   PLT  229  223   GRANS  87*  79*   LYMPH  8*  14*   EOS  0  0         Iron/Ferritin No results for input(s): IRON in the last 72 hours. No lab exists for component: TIBCCALC   PTH/VIT D No results for input(s): PTH in the last 72 hours.     No lab exists for component: VITD            Hortencia Krishnamurthy M.D  [facility]  Office [phone]  Pager [phone]    January 3, 2017

## 2017-01-03 NOTE — PROGRESS NOTES
Discussed with Dr. Jacqueline Ruiz. Presented to ER with A.fib/RVR at 150 bpm by report and given diltiazem but rate not controlled and SBP dropped to 50's, improved with IVF. ESRD limits additional agents and same thing likely to occur with beta-blocker.      -Plan amiodarone and monitoring on telemetry. Interestingly INR 1.4 despite not taking Coumadin by report but may offer some benefit if he converts.   -If no bleeding contraindications, reasonable to start heparin drip for both A.fib and indeterminate troponin. Trend enzymes.  -Need to exclude sepsis as trigger.    -Final consult to follow. Addendum 20:00:  EKG uploaded and cannot exclude sinus tachycardia vs atrial tachycardia or atrial fluter at least during EKG but clearly at risk for A.fib/flutter/tach. Continue amiodarone.

## 2017-01-03 NOTE — PROGRESS NOTES
PCCM #2    Regarding PICC Line. This appears to have been placed around 12/13/2016 at [facility]. It was not present in 9/2016 here. The site does not appear to be inflammed/ infected. He does not appear septic; is afebrile. VSS. The (+)bcx may be a contaminant from collection process. He is on 3 abx. RECOMMENDATION:  See order - rotate abx through the lumens; leave in situ.  Will follow.      -[facility]  [phone]

## 2017-01-03 NOTE — PROGRESS NOTES
completed the initial Spiritual Assessment of the patient in room 5 of the emergency room, and offered Pastoral Care support and prayer. No family seen atih patient at this time but patient is alert and talkative. Patient does not have any Confucianist/cultural needs that will affect patients preferences in health care.    Chaplains will continue to follow and will provide pastoral care on an as needed/requested basis     Chaplain Ashok Bryan   Board Certified [address] [place] [address]   [phone]

## 2017-01-03 NOTE — CDMP QUERY
Review of the documentation for this patient demonstrates the clinical indicators of a BMI of 17, height 5'9\" and weight 115 lbs. The medical record reflects the following clinical findings, treatment, and risk factors:    81 y/o adm with sepsis, septic shock and ESRD. On admission, weight 115 lbs, height 5'9\", BMI 17. Please clarify and document your clinical opinion in the progress notes and discharge summary including the definitive and/or presumptive diagnosis, (suspected or probable), related to the above clinical findings. Please include clinical findings supporting your diagnosis. If you DECLINE this query or would like to communicate with [facility], please utilize the \"[employer] message box\" at the TOP of the Progress Note on the right.       Thank you,    Cheyanne Morris -[phone]  [address]

## 2017-01-03 NOTE — ED NOTES
Purposeful rounding completed:    Side rails up x 2:  YES  Bed in low position and wheels locked: YES  Call bell within reach: YES  Comfort addressed: YES    Toileting needs addressed: YES  Plan of care reviewed/updated with patient and or family members: YES  IV site assessed: YES  Pain assessed and addressed: YES  Patient repositioned, lines untangled.

## 2017-01-03 NOTE — ED NOTES
Purposeful rounding completed:    Side rails up x 2:  YES  Bed in low position and wheels locked: YES  Call bell within reach: YES  Comfort addressed: YES    Toileting needs addressed: YES  Plan of care reviewed/updated with patient and or family members: YES  IV site assessed: YES  Pain assessed and addressed: YES.

## 2017-01-03 NOTE — PROGRESS NOTES
1882-3060: Patient transported to NSICU from the ED. Unable to get the room tele monitor to work properly. Patient staying on transport monitor until maintenance able to assess. Patient has no pulses below his both of his knees. His legs are colder than the rest of his body & he is missing his 2nd toenail on both his left & right foot. Axillary temp 96.4. Oral read not picking up. Rectal temp performed & 97.1    0902: Patient's tele monitor fixed and working properly. Unfortunately we are still unable to get spO2 on patient. Pulse ox tried on 7 different spots with no success. Warmed sites but still unable to get spO2 on patient. 9806-6339: Informed Dr. Andree Palma about critical lactic acid. Informed him of my assessment. Dr. Andree Palma seeing patient & stated he will order a vascular surgery consult and also a palliative care consult. [id number]: 2nd RN, Gaetano Mantilla, trying to get spO2 to  on patient. Multiple sites attempted. No success. 0656: Dr. Zoila Nguyen in the room with patient. 8547: Patient had a smear BM. 1000: Jaime Nguyen aware that unable to get reliable pulse ox on patient. 1006: POC glucose checked through the PICC. PICC flushed and wasted then obtained blood sample for POC. 1042: Heparin bolus given    1110: Called pharmacy about heparin drip. 3070-0525: 3rd RN, Jaiden Parra trying to get pulse ox to read. Multiple sites and warming attempted. No success. 1200: 97.2 rectal temp. 6680-3852: 4th RN, Pamela Salinas trying to get pulse ox to read. No success. 1300: Talked with wound care RNs about patient. Assisting wound care RNs.     1400: SLP in room seeing patient. 1410: Lactic acid drawn on patient. 1500: 5th RN, Jim Sampson trying to get pulse ox to read. Multiple sites attempted. No success. 1514: Patient having a more difficult time breathing. Still unable to get reliable pulse ox. Lung sounds unchanged which have been coarse.  Patient pulled up in bed again. NC increased from 2 L to 4 L. Paged Dr. Jackie Nicole    1600: Rectal temp 97.3    1629: Paged Dr. Jackie Nicole. 1630: Talked with Dr. Jackie Nicole     [id number], ICU PA ordered to do a blood culture from the patient's PICC. 1710: Aptt lab drawn    1715: Blood culture from PICC drawn     1720: Blood culture from PICC drawn. 1800: Patient had a smear BM. Cleaned patient. 1921: Called by chemistry & Lactic acid trending upward 12.5 this lactic acid was drawn @ 1410. Lactic acid now due which is why it wasn't done 4 hours after last one.     1922: Bedside and Verbal shift change report given to SANTINO Barrera (oncoming nurse) by Dionna Reynolds RN (offgoing nurse). Report included the following information SBAR, Kardex, ED Summary, OR Summary, Procedure Summary, Intake/Output, MAR, Accordion, Recent Results, Med Rec Status, Cardiac Rhythm A. Fib and Alarm Parameters .

## 2017-01-03 NOTE — PALLIATIVE CARE
Full note to follow     Patient seen, alerts to his name, not oriented, not able to participate in conversation. Discussed with niece who has been with patient since age 3 and her daughter. Mr. Henri Rivera wife  3 years ago, no biological children. All siblings have passed. Melissa is niece but raised by patient and wife since age 3. No other family active in care she is legal next of kin. Discussed current medical condition, they are aware of how ill patient is. Niece has decided on no CPR or intubation for any reason patient is DNR/DNI. Discussed current treatment, they wish to continue all current treatment including pressor support, heparin and option for HD tomorrow. We have discussed comfort care as an option and they wish to discuss.

## 2017-01-03 NOTE — PROGRESS NOTES
Medicine Progress Note    Patient: Hussein Russell   Age:  80 y.o.  DOA: 1/2/2017   Admit Dx / CC: Hypotension  Atrial fibrillation with RVR ([address]  LOS:  LOS: 1 day     Assessment/Plan   Principal Problem:    Shock ([address] (1/3/2017)    Active Problems:    Hypotension (1/2/2017)      Atrial fibrillation with RVR ([address] (1/2/2017)      Sepsis ([address] (1/2/2017)      Lactic acidosis (1/3/2017)        Additional Plan notes     1)  Shock- Likely septic at this point, less likely hypovolemic. No more fluids 2/2 ESRD currently. Continue on levophed, Intensivist on consult. Cause unknown i question the acuity of his Lower extremity ischemia this could be simply chronic as he has recent vascular studies from [facility] however given his cool extremity and lactic of 9.2 on a pressor feel its best to get vascular involved. I do see signs of dry gangrene but not wet at this time. 2)  ESRD- nephrology aware, spoke to dialysis nurse    3)  Lactic acid-  Possibly 2/2 ESRD but surely not entirely probably a source of infection , perhaps limb ischemia, continue broad abx. Careful with any fluid administration    4)  Afib RVR??, elevated trop Looks more sinus now-  On amio, cards consulted. Elevated trop probably from CKD. 5)  Acute encephalopathy-  Probably from infection      DISPO     Anticipated Date of Discharge: ? ??? Anticipated Disposition (home, SNF) : SNF    Subjective:   Patient seen and examined. Confused, awakens on sternal rub. Objective:     Visit Vitals    /62    Pulse (!) 115    Temp 97.4 °F (36.3 °C)    Resp 21    Ht 5' 9\" (1.753 m)    Wt 52.2 kg (115 lb)    SpO2 94%    BMI 16.98 kg/m2       Physical Exam:  General appearance: alert, cooperative, no distress, appears stated age  Head: Normocephalic, without obvious abnormality, atraumatic  Neck: supple, trachea midline  Lungs: clear to auscultation bilaterally  Heart: tachycardic  Abdomen: soft, non-tender.  Bowel sounds normal. No masses,  no organomegaly  Extremities: b/l cold LE, dry gangrene on b/l toes, area of malleolar ulceration on R with drainage. Skin: as above  Neurologic: Grossly normal, confused, non verbal, awakes to tactile stim.     Intake and Output:  Current Shift:     Last three shifts:       Lab/Data Reviewed:  CMP:   Lab Results   Component Value Date/Time     01/03/2017 03:30 AM    K 3.7 01/03/2017 03:30 AM    CL 99 (L) 01/03/2017 03:30 AM    CO2 21 01/03/2017 03:30 AM    AGAP 18 01/03/2017 03:30 AM     (H) 01/03/2017 03:30 AM    BUN 76 (H) 01/03/2017 03:30 AM    CREA 4.81 (H) 01/03/2017 03:30 AM    GFRAA 14 (L) 01/03/2017 03:30 AM    GFRNA 11 (L) 01/03/2017 03:30 AM    CA 9.8 01/03/2017 03:30 AM    MG 2.3 01/03/2017 03:30 AM    ALB 2.9 (L) 01/02/2017 04:50 PM    TP 7.4 01/02/2017 04:50 PM    GLOB 4.5 (H) 01/02/2017 04:50 PM    AGRAT 0.6 (L) 01/02/2017 04:50 PM    SGOT 33 01/02/2017 04:50 PM    ALT 27 01/02/2017 04:50 PM     CBC:   Lab Results   Component Value Date/Time    WBC 8.7 01/03/2017 03:30 AM    HGB 9.1 (L) 01/03/2017 03:30 AM    HCT 26.6 (L) 01/03/2017 03:30 AM     01/03/2017 03:30 AM     All Cardiac Markers in the last 24 hours:   Lab Results   Component Value Date/Time     01/03/2017 06:28 AM    CPK 98 01/02/2017 11:00 PM     01/02/2017 04:50 PM    CKMB 6.7 (H) 01/03/2017 06:28 AM    CKMB 4.0 (H) 01/02/2017 11:00 PM    CKMB 3.8 (H) 01/02/2017 04:50 PM    CKND1 5.3 (H) 01/03/2017 06:28 AM    CKND1 4.1 (H) 01/02/2017 11:00 PM    CKND1 3.8 01/02/2017 04:50 PM    TROIQ 0.89 (H) 01/03/2017 06:28 AM    TROIQ 0.69 (H) 01/02/2017 11:00 PM    TROIQ 0.65 (H) 01/02/2017 04:50 PM       Medications Reviewed:  Current Facility-Administered Medications   Medication Dose Route Frequency    sodium chloride (NS) flush 5-10 mL  5-10 mL IntraVENous Q8H    sodium chloride (NS) flush 5-10 mL  5-10 mL IntraVENous PRN    heparin (porcine) injection 5,000 Units  5,000 Units SubCUTAneous Q8H    amiodarone (NEXTERONE) 360 mg in dextrose 200 mL (1.8 mg/mL) infusion  0.5-1 mg/min IntraVENous TITRATE    piperacillin-tazobactam (ZOSYN) 2.25 g in 0.9% sodium chloride (MBP/ADV) 50 mL MBP  2.25 g IntraVENous Q8H    levoFLOXacin (LEVAQUIN) 500 mg in D5W IVPB  500 mg IntraVENous Q48H    NOREPINephrine (LEVOPHED) 8,000 mcg in dextrose 5% 250 mL infusion  2-30 mcg/min IntraVENous TITRATE       Elodia Monroe MD    January 3, 2017

## 2017-01-03 NOTE — PROGRESS NOTES
[facility]   Discharge Planning/ Assessment    Reasons for Intervention: Chart reviewed. Attempted to meet with pt., currently not stable, asked by nursing to check back later, states he won't be able to answer questions. Called NOK: Sammy Angelica, dtr, her  states she is at work but that I need to speak with Eldon Lemon, she handles pt's care. Called Eliza, no answer, left VM for her to call me back. Per chart pt goes to [facility] dialysis [address] Dr. Kowalski Roles is his PCP. Noted consult for palliative care. Discharge plan will depend on outcome of palliative care. Pt has been to SNF in the past.  Will cont to follow. Pat [address] [address].      High Risk Criteria  [x] Yes  []No   Physician Referral  [] Yes  [x]No        Date    Nursing Referral  [] Yes  [x]No        Date    Patient/Family Request  [] Yes  [x]No        Date       Resources:    Medicare  [x] Yes  []No   Medicaid  [] Yes  [x]No   No Resources  [] Yes  [x]No   Private Insurance  [x] Yes  []No    Name/Phone Number    Other  [] Yes  [x]No        (i.e. Workman's Comp)         Prior Services:    Prior Services  [x] Yes  []No   Home Health  [] Yes  [x]No   [address]  [] Yes  [x]No        Number of [address]  [] Yes  [x]No       Meals on Wheels  [] Yes  [x]No   Office on Aging  [] Yes  [x]No   Transportation Services  [] Yes  [x]No   Nursing Home  [] Yes  [x]No        Nursing Home Name    [address]  [] Yes  [x]No        [address] Name    Other       Information Source:      Information obtained from  [] Patient  [] Parent   [] [address]  [] Child  [] Spouse   [] Significant Other/Partner   [] Friend      [] EMS    [] Nursing Home Chart          [] Other:   Chart Review  [x] Yes  []No     Family/Support System:    Patient lives with  [] Alone    [] Spouse   [] Significant Other  [] Children  [] Caretaker   [] Parent  [] Sibling     [] Other       Other Support System:    Is the patient responsible for care of others  [] Yes  [x]No   Information of person caring for patient on  discharge    Managers financial affairs independently  [] Yes  [x]No   If no, explain:      Status Prior to Admission:    Mental Status  [] Awake  [] Alert  [] Oriented  [] Quiet/Calm [] Lethargic/Sedated   [] Disoriented  [] Restless/Anxious  [] Combative   Personal Care  [x] Dependent  [] [address]  [] Requires Assistance   Meal Preparation Ability  [] Independent   [] Standby Assistance   [] Minimal Assistance   [] Moderate Assistance  [] Maximum Assistance     [x] Total Assistance   Chores  [] Independent with Chores   [] N/A Nursing Home Resident   [x] Requires Assistance   Bowel/Bladder  [] Continent  [] Catheter  [x] Incontinent  [] Ostomy Self-Care    [] Urine Diversion Self-Care  [] Maximum Assistance     [] Total Assistance   Number of Persons needed for assistance    DME at home  [] Cris Leslie  [] Dulce Leslie   [] Commode    [] Bathroom/Grab Bars  [] Hospital Bed  [] Nebulizer  [] Oxygen           [] Raised Toilet Seat  [] Shower Chair  [] Side Rails for Bed   [] Tub Transfer Bench   [] Alesha Mirna  [] Toni Ace, Standard      [] Other:   Vendor      Treatment Presently Receiving:    Current Treatments  [] Chemotherapy  [x] Dialysis  [] Insulin  [] IVAB [x] IVF   [x] O2  [] PCA   [] PT   [] RT   [] Tube Feedings   [] Wound Care     Psychosocial Evaluation:    Verbalized Knowledge of Disease Process  [] Patient  []Family   Coping with Disease Process  [] Patient  []Family   Requires Further Counseling Coping with Disease Process  [] Patient  []Family     Identified Projected Needs:    Home Health Aid  [] Yes  [x]No   Transportation  [] Yes  [x]No   Education  [] Yes  [x]No        Specific Education     Financial Counseling  [] Yes  [x]No   Inability to Care for Self/Will Require 24 hour care  [] Yes  [x]No   Pain Management  [] Yes  [x]No   Home Infusion Therapy [] Yes  [x]No   Oxygen Therapy  [] Yes  [x]No   DME  [] Yes  [x]No   Long Term Care Placement  [] Yes  [x]No   Rehab  [] Yes  [x]No   Physical Therapy  [] Yes  [x]No   Needs Anticipated At This Time  [x] Yes  []No     Intra-Hospital Referral:    [facility]  [] Yes  [x]No     [] Yes  [x]No   Patient Representative  [] Yes  [x]No   Staff for Teaching Needs  [] Yes  [x]No   Specialty Teaching Needs     Diabetic Educator  [] Yes  [x]No   Referral for Diabetic Educator Needed  [] Yes  [x]No  If Yes, place order for Nutritionist or Diabetic Consult     Tentative Discharge Plan:    Home with No Services  [] Yes  [x]No   Home with [address]  [] Yes  [x]No        If Yes, specify type    Home Care Program  [] Yes  [x]No        If Yes, specify type    Meals on Wheels  [] Yes  [x]No   Office of Aging  [] Yes  [x]No   NHP  [] Yes  [x]No   Return to the Nursing Home  [] Yes  [x]No   Rehab Therapy  [] Yes  [x]No   Acute Rehab  [] Yes  [x]No   Subacute Rehab  [] Yes  [x]No   Private Care  [] Yes  [x]No   Substance Abuse Referral  [] Yes  [x]No   Transportation  [] Yes  [x]No   Chore Service  [] Yes  [x]No   Inpatient Hospice  [] Yes  [x]No   OP RT  [] Yes  [x] No   OP Hemo  [] Yes  [x] No   OP PT  [] Yes  [x]No   Support Group  [] Yes  [x]No   Reach to Recovery  [] Yes  [x]No   OP Oncology Clinic  [] Yes  [x]No   Clinic Appointment  [] Yes  [x]No   DME  [] Yes  [x]No   Comments    Name of D/C Planner or  Given to Patient or Family Mani Record   Phone Number Pager: [phone]        Extension Ext. [phone]  [phone]   Date 1-3-2017   Time    If you are discharged home, whom do you designate to participate in your discharge plan and receive any information needed? Enter name of Terrence Kline / Stephenie Harris        Phone # of designee [phone] / [phone].         Address of designee         Updated         Patient refused to designate any           individual

## 2017-01-03 NOTE — DIABETES MGMT
NUTRITIONAL ASSESSMENT AND PLAN OF CARE     Murray Hardin           80 y.o.           1/2/2017                 1. Atrial fibrillation with RVR ([address]    2. Idiopathic hypotension         [x]  No Cultural, Sikhism or ethnic dietary need identified. []  Cultural, Sikhism and ethnic food preferences identified and addressed    []  Participated in discharge planning/Interdisciplinary rounds   Food allergies: [x]  No        []  Yes-  ASSESSMENT:   Pt is 72% ideal wt; BMI (calculated): 17.0 kg/m2 (underweight). Appears thin. Notes indicate pt with bilateral lower extremity ischemia and gangrene. Pt awake this morning but appears fatigued and no po taken, awaiting SLP recommendations. Unable to access [facility] records to determine diet at [facility]. INTERVENTIONS/PLAN:   Monitor feeding status, weights and labs. SUBJECTIVE/OBJECTIVE:   Information obtained from: chart review, RN;  Pt not able to provide diet/weight history. [facility] but computer does not bring them up (tried on 2  computers). Admission to [facility] on 12/13/16 indicates pt was wheelchair bound at [facility]. Diet: NPO   No data found.     Medications: [x]                Reviewed     Most Recent POC Glucose:   Recent Labs      01/03/17   0330  01/02/17   1650   GLU  100*  90         Labs:   Lab Results   Component Value Date/Time    Hemoglobin A1c 5.1 01/18/2015 05:30 AM     Lab Results   Component Value Date/Time    Sodium 138 01/03/2017 03:30 AM    Potassium 3.7 01/03/2017 03:30 AM    Chloride 99 01/03/2017 03:30 AM    CO2 21 01/03/2017 03:30 AM    Anion gap 18 01/03/2017 03:30 AM    Glucose 100 01/03/2017 03:30 AM    BUN 76 01/03/2017 03:30 AM    Creatinine 4.81 01/03/2017 03:30 AM    Calcium 9.8 01/03/2017 03:30 AM    Magnesium 2.3 01/03/2017 03:30 AM    Phosphorus 4.6 09/08/2016 09:33 AM    Albumin 2.9 01/02/2017 04:50 PM       Anthropometrics: IBW : 72.6 kg (160 lb), % IBW (Calculated): 71.92 %, BMI (calculated): 17  Wt Readings from Last 1 Encounters:   01/03/17 52.2 kg (115 lb 1.3 oz)    9-11-16 weight from previous admission at [facility] - 55 kg  12-13-16 weight from Chart Everywhere/admission to [facility] - 44 kg  Ht Readings from Last 1 Encounters:   01/03/17 5' 9\" (1.753 m)       Estimated Nutrition Needs: 1827 Kcals/day, Protein (g): 63 g Fluid (ml):  (1 L + urine output)  Based on:   [x]          Actual BW    []          IBW   []            Adjusted BW        Nutrition Diagnoses:   Underweight due to inadequate energy intake as evidenced by BMI of 17.0 kg/m2 and pt is 72% ideal wt. Altered nutrition related labs due to ESRD as evidenced by GFR of 14. Inadequate oral food and beverage intake due to medical condition/weakness as evidenced by NPO orders and SLP consult pending  Nutrition Interventions:  None at this time - NPO  Goal:   Provision of adequate nutrition by 1/8/17. Weight maintenance (+/- 1-2 kg) or weight gain of 1-2# per week by 1/13/17. Renal labs at baseline by 1/8/17.           Nutrition Monitoring and Evaluation      []     Monitor po intake on meal rounds  [x]     Continue inpatient monitoring and intervention  [x]     Other: monitor feeding status    Nutrition Risk:  [x]   High     []  Moderate    []  Minimal/Uncompromised    Ami Amaya RD   LR pager [phone]

## 2017-01-03 NOTE — PROGRESS NOTES
Problem: Dysphagia (Adult)  Goal: *Acute Goals and Plan of Care (Insert Text)  Dysphagia Present: mild-mod  Aspiration: at risk     Recommendations:  Diet: puree  Meds: crushed  Aspiration Precautions  Oral Care TID=    Goals: Patient will:  1. Tolerate PO trials with 0 s/s overt distress in 4/5 trials  2. Utilize compensatory swallow strategies/maneuvers (decrease bite/sip, size/rate, alt. liq/sol) with min cues in 4/5 trials  3. Complete an objective swallow study (i.e., MBSS) to assess swallow integrity, r/o aspiration, and determine of safest LRD, min A  SPEECH LANGUAGE PATHOLOGY BEDSIDE SWALLOW EVALUATION     Patient: Tali Motta (18 y.o. male)  Date: 1/3/2017  Primary Diagnosis: Hypotension  Atrial fibrillation with RVR ([facility])        Precautions: aspiration         ASSESSMENT :  Based on the objective data described below, the patient presents with mild-mod OP dysphagia in the setting of AMS. Pt drowsy; alerts to stim; falls asleep quickly. Family at b/s endorsing regular solid diet at [facility]. Has dentures but are not here. Pt accepted serial swallows of thin liquid + straw without aspiration s/s. Applesauce accepted with incoordinated oral bolus prep; no aspiration s/s. Attempted cracker; pt unable to manipulate and began to fall asleep. SLP removed cracker. Recommend initiation of puree diet (only when alert). Educated family on aspiration precautions and importance of compensatory swallow techniques to decrease aspiration risk (decrease rate of intake & sip/bite size, upright @HOB for all po intake and ~30 minutes after po); verbalized comprehension. Patient will benefit from skilled intervention to address the above impairments.   Patients rehabilitation potential is considered to be Fair  Factors which may influence rehabilitation potential include:   [ ]            None noted  [X]            Mental ability/status  [X]            Medical condition  [ ]            Home/family situation and support systems  [ ]            Safety awareness  [ ]            Pain tolerance/management  [ ]            Other:        PLAN :  Recommendations and Planned Interventions:  puree  Frequency/Duration: Patient will be followed by speech-language pathology 1-2 times per day/4-7 days per week to address goals. Discharge Recommendations: Skilled Nursing Facility       SUBJECTIVE:   Family stated thanks so much\". OBJECTIVE:       Past Medical History   Diagnosis Date    Acute gout      Blind right eye      Cardiomyopathy ([address]      ESRD on hemodialysis (HCC)         [address] Dr. Leellen Reel Gout      HTN (hypertension)      Hyperlipemia       Past Surgical History   Procedure Laterality Date    Hx hernia repair         Prior Level of Function/Home Situation: SNF     Diet prior to admission: regular  Current Diet:  puree   Cognitive and Communication Status:  Neurologic State: Drowsy, Eyes open to stimulus  Orientation Level: Oriented to person, Unable to verbalize  Cognition: Decreased attention/concentration, Decreased command following  Perception: Cues to maintain midline in sitting     Safety/Judgement: Fall prevention  Oral Assessment:  Oral Assessment  Labial: Decreased rate;Decreased seal  Dentition: Edentulous  Oral Hygiene: good  Lingual: Decreased rate;Decreased strength  Velum: Unable to visualize  Mandible: No impairment  P.O. Trials:  Patient Position: [facility]  Vocal quality prior to P.O.: Low volume  Consistency Presented: Thin liquid;Pudding;Mechanical soft  How Presented: SLP-fed/presented;Straw;Successive swallows;Spoon     Bolus Acceptance: No impairment  Bolus Formation/Control: Impaired  Type of Impairment: Delayed;Mastication;Poor  Propulsion: Delayed (# of seconds); Discoordination  Oral Residue: Less than 10% of bolus; Lingual  Initiation of Swallow: Delayed (# of seconds)  Laryngeal Elevation: Functional  Aspiration Signs/Symptoms: None  Pharyngeal Phase Characteristics: Poor endurance  Effective Modifications: Small sips and bites  Cues for Modifications: Moderate        Oral Phase Severity: Moderate  Pharyngeal Phase Severity : Mild     GCODESwallowing:  Swallow Current Status CJ= 20-39%   Swallow Goal Status CI= 1-19%     The severity rating is based on the following outcomes:  IGNACIO Noms Swallow Level 5              Clinical Judgement     PAIN:  Start of Eval: 0  End of Eval: 0      After treatment:   [ ]            Patient left in no apparent distress sitting up in chair  [X]            Patient left in no apparent distress in bed  [X]            Call bell left within reach  [X]            Nursing notified  [X]            Family present  [ ]            Caregiver present  [ ]            Bed alarm activated      COMMUNICATION/EDUCATION:   [X]            Aspiration precautions; swallow safety; compensatory techniques. [X]            Patient/family have participated as able in goal setting and plan of care. [ ]            Patient/family agree to work toward stated goals and plan of care. [ ]            Patient understands intent and goals of therapy; neutral about participation. [X]            Patient unable to participate in goal setting/plan of care; educ ongoing with family and interdisciplinary staff  [ ]             Posted safety precautions in patient's room.      Thank you for this referral.  Balbir Parkinson, SLP  Time Calculation: 25 mins

## 2017-01-03 NOTE — WOUND CARE
Patient's nurse, Kelly Maya, SANTINO requested that wound care give recommendations for a small open area on patient's right ankle. Patient also has multiple gangrenous toes that he is being followed by vascular and internal medicine for treatment. Wound care recommends Aquacel Ag to right lateral malleolus covered by bordered foam to be changed every three days. Discussed with Dr. León Pereira and orders received.

## 2017-01-03 NOTE — CONSULTS
D/w Dr. Jesica Hackett, following information taken from him and EMR review. Patient with HTN, ESRD on HD admitted from HD center for hypotension in sbp 70's, received 250 ml bolus in er. Also found to have afib with rvr, received cardizem bolus leading to hypotension, followed by started on amio drip after bolus per cardiologist. Mildly elevated trop but no symptoms of angina. No fever or leucocytosis. Chronic pleural effusion on cxr. Mildly elevated lactate 2.2 but no sing of infection. Hx of HTN but his sbp was also noted to be in 100's in previous hospitalization, and he is not on anty anti-htn meds. His last sbp in low 100's. I have requested central line to be placed in ER. Will consider starting levophed if required. Should get echo for LVEF eval. Patient is not making any urine. Requested to order blood cx sputum cx and start broad spectrum abx vanco zosyn levaquin for now and quickly narrow it down. On cxr, there appears to have right UE PICC line. Will order blood cx through picc line also. Patient is full code. Full consult to follow in am. Call us with any questions to night.      Ivory Mackenzie MD 1/2/2017 8:53 PM

## 2017-01-03 NOTE — PROGRESS NOTES
Spoke to [employer] in lab, blood cultures were d/c by , reordered blood cultures one set to be drawn from PICC line and patient is to have 2 peripheral sets drawn.  Raphael Cheek RN in ED aware

## 2017-01-03 NOTE — ACP (ADVANCE CARE PLANNING)
Patient is unable to designate anyone  to participate in his/her discharge plan and to receive any needed information due to medical issues & mental status. NOK: Daughter & grand-dtr. Name:  Cherrie Trujillo  Address:  Phone number:  [phone] / [phone].

## 2017-01-03 NOTE — H&P
History and Physical    Patient: Kelton Real               Sex: male          DOA: 1/2/2017       YOB: 1923      Age:  80 y.o.        LOS:  LOS: 0 days        Chief Complaint   Patient presents with    Hypotension         HPI:     Kelton Real is a 80 y.o. male who presented in ED for evaluation of hypotension. Per ED physician patient was at the dialysis center today when it was noted he was hypotensive. He had not yet had the dialysis. In the ED he was noted to have tachycardia and hypotension systolic in the low 24'F. He was was initially given IV NS 1500 cc total. He also was given bolus Cardizem and subsequently switched to amiodarone bolus and then infusion on the recommendation of cardiology. Troponin is elevated. Patient also had elevated lactic acid. Patient was not started on antibiotics. Patient will be admitted for further treatment    Past Medical History   Diagnosis Date    Acute gout     Blind right eye     Cardiomyopathy ([facility] [address]     Gout     HTN (hypertension)     Hyperlipemia     Kidney disease         Past Surgical History   Procedure Laterality Date    Hx hernia repair         No current facility-administered medications on file prior to encounter. Current Outpatient Prescriptions on File Prior to Encounter   Medication Sig Dispense Refill    calcitRIOL (ROCALTROL) 0.25 mcg capsule Take 0.25 mcg by mouth daily.  Cholecalciferol, Vitamin D3, 3,000 unit tab Take  by mouth.  cinacalcet (SENSIPAR) 30 mg tablet Take 30 mg by mouth daily.  levothyroxine (SYNTHROID) 50 mcg tablet Take  by mouth Daily (before breakfast).  allopurinol (ZYLOPRIM) 300 mg tablet 300 mg.  simvastatin (ZOCOR) 40 mg tablet 40 mg.            Social History:   Tobacco use: unknown   Alcohol use: unknown   Occupation: retired    Family History: none      Review of Systems: Unable to obtain due to acuity            Physical Exam:      Visit Vitals    /59    Pulse (!) 134    Temp 98.1 °F (36.7 °C)    Resp 22    Ht 5' 9\" (1.753 m)    Wt 52.2 kg (115 lb)    SpO2 97%    BMI 16.98 kg/m2       Physical Exam:    Gen:  No distress, lethargic   HEENT:  Normal cephalic atraumatic, extra-occular movements are intact. Neck:  Supple, No JVD  Lungs:  Clear bilaterally, no wheeze, no rales, normal effort  Heart:  Regular Rate and Rhythm, normal S1 and S2, no edema  Abdomen:  Soft, non tender, normal bowel sounds, no guarding.   Extremities:  Well perfused, no cyanosis or edema  Neurological:  Awake and alert, CN's are intact, normal strength throughout extremities  Skin:  No rashes or moles, capp refill < 3 sec  Mental Status: non responsive possibly at baseline     Laboratory Studies:    Recent Results (from the past 24 hour(s))   EKG, 12 LEAD, INITIAL    Collection Time: 01/02/17  4:29 PM   Result Value Ref Range    Ventricular Rate 146 BPM    Atrial Rate 141 BPM    QRS Duration 100 ms    Q-T Interval 316 ms    QTC Calculation (Bezet) 492 ms    Calculated R Axis 96 degrees    Calculated T Axis -102 degrees    Diagnosis       Undetermined rhythm  Rightward axis  Low voltage QRS  Septal infarct , age undetermined  ST & T wave abnormality, consider inferolateral ischemia  Abnormal ECG  When compared with ECG of 06-SEP-2016 15:38,  Current undetermined rhythm precludes rhythm comparison, needs review  Septal infarct is now present     PROTHROMBIN TIME + INR    Collection Time: 01/02/17  4:50 PM   Result Value Ref Range    Prothrombin time 16.7 (H) 11.5 - 15.2 sec    INR 1.4 (H) 0.8 - 1.2     PTT    Collection Time: 01/02/17  4:50 PM   Result Value Ref Range    aPTT 33.3 23.0 - 36.4 SEC   CBC WITH AUTOMATED DIFF    Collection Time: 01/02/17  4:50 PM   Result Value Ref Range    WBC 7.0 4.6 - 13.2 K/uL    RBC 3.12 (L) 4.70 - 5.50 M/uL    HGB 10.0 (L) 13.0 - 16.0 g/dL    HCT 29.4 (L) 36.0 - 48.0 %    MCV 94.2 74.0 - 97.0 FL    MCH 32.1 24.0 - 34.0 PG    MCHC 34.0 31.0 - 37.0 g/dL    RDW 14.6 (H) 11.6 - 14.5 %    PLATELET 076 645 - 155 K/uL    MPV 9.7 9.2 - 11.8 FL    NEUTROPHILS 79 (H) 40 - 73 %    LYMPHOCYTES 14 (L) 21 - 52 %    MONOCYTES 7 3 - 10 %    EOSINOPHILS 0 0 - 5 %    BASOPHILS 0 0 - 2 %    ABS. NEUTROPHILS 5.5 1.8 - 8.0 K/UL    ABS. LYMPHOCYTES 1.0 0.9 - 3.6 K/UL    ABS. MONOCYTES 0.5 0.05 - 1.2 K/UL    ABS. EOSINOPHILS 0.0 0.0 - 0.4 K/UL    ABS. BASOPHILS 0.0 0.0 - 0.06 K/UL    DF AUTOMATED     METABOLIC PANEL, BASIC    Collection Time: 01/02/17  4:50 PM   Result Value Ref Range    Sodium 138 136 - 145 mmol/L    Potassium 3.9 3.5 - 5.5 mmol/L    Chloride 98 (L) 100 - 108 mmol/L    CO2 28 21 - 32 mmol/L    Anion gap 12 3.0 - 18 mmol/L    Glucose 90 74 - 99 mg/dL    BUN 76 (H) 7.0 - 18 MG/DL    Creatinine 4.79 (H) 0.6 - 1.3 MG/DL    BUN/Creatinine ratio 16 12 - 20      GFR est AA 14 (L) >60 ml/min/1.73m2    GFR est non-AA 11 (L) >60 ml/min/1.73m2    Calcium 10.9 (H) 8.5 - 10.1 MG/DL   CARDIAC PANEL,(CK, CKMB & TROPONIN)    Collection Time: 01/02/17  4:50 PM   Result Value Ref Range     39 - 308 U/L    CK - MB 3.8 (H) 0.5 - 3.6 ng/ml    CK-MB Index 3.8 0.0 - 4.0 %    Troponin-I, Qt. 0.65 (H) 0.0 - 0.045 NG/ML   LIPASE    Collection Time: 01/02/17  4:50 PM   Result Value Ref Range    Lipase 185 73 - 393 U/L   HEPATIC FUNCTION PANEL    Collection Time: 01/02/17  4:50 PM   Result Value Ref Range    Protein, total 7.4 6.4 - 8.2 g/dL    Albumin 2.9 (L) 3.4 - 5.0 g/dL    Globulin 4.5 (H) 2.0 - 4.0 g/dL    A-G Ratio 0.6 (L) 0.8 - 1.7      Bilirubin, total 0.7 0.2 - 1.0 MG/DL    Bilirubin, direct 0.3 (H) 0.0 - 0.2 MG/DL    Alk.  phosphatase 217 (H) 45 - 117 U/L    AST 33 15 - 37 U/L    ALT 27 16 - 61 U/L   MAGNESIUM    Collection Time: 01/02/17  4:50 PM   Result Value Ref Range    Magnesium 2.5 (H) 1.8 - 2.4 mg/dL   POC LACTIC ACID    Collection Time: 01/02/17  4:52 PM   Result Value Ref Range    Lactic Acid (POC) 2.2 (HH) 0.4 - 2.0 mmol/L   POC LACTIC ACID    Collection Time: 01/02/17  9:25 PM Result Value Ref Range    Lactic Acid (POC) 2.9 (HH) 0.4 - 2.0 mmol/L     CXRp - bilateral pleural effusion R > L, opacity RLL    Assessment/Plan     Active Problems:    Hypotension (1/2/2017)      Atrial fibrillation with RVR ([facility] [address] (1/2/2017)      Sepsis ([facility] [address] (1/2/2017)        ESRD  Anemia of chronic disease  Lactic acidosis  Elevated Troponin  CMO  HLD  Gout      PLAN:    Hypotension  - probably 2/2 sepsis  - On levophed with parameters  - Intensivist consulted    Sepsis  - unknown source  - CXRp reviewed opacity RLL ?  pneumonia  - UA pending  - Will start antibiotics  - Blood and urine culture ordered  - will hold further IVF in order not to overload patient with ESRD on hemodialysis  - continue to monitor BP    Atrial Fib/RVR  - on Amiodarone per cardiology  - continue cardiac monitoring  - ECHO in AM  - Check TSH, Magnesium   - Cardiology consulted    ESRD  - On HD mwf  - continue home med  - did not get dialysis today due to hypotension  - consult Nephrology in AM     Anemia of chronic Dz  - monitor cbc    Lactic acidosis  - trend    Elevated Troponin  - probably 2/2 renal dysfunction  - trend   - ECHO in AM     CMO  - Zocor    HLD  - Zocor    Gout  - allopurinol    Hypothyroid  - TSH ordered  - On synthroid    DVT Prophylaxis - Heparin    GI Prophylaxis - Protronix    Full code

## 2017-01-03 NOTE — CONSULTS
[facility] - Consult Note    Date of  Admission: 1/2/2017  3:37 PM   Primary Care Physician:  Jaron Merino MD     Seen and examined independently and agree with below with the additional comments. Yesterday hypotensive and could not tolerate dialysis. Concern for ischemic lower extremities, infectious process, sepsis with pressor dependent hypotension,  lactic acidosis as well as relatively elevated WBC w/ left shift. Atrial fibrillation noted yesterday and started on Amiodarone. Remains modestly tachycardic in the setting of multiple physiologic stressors. Unclear duration of atrial fibrillation or potential for embolic event causing lower extremity ischemia. On heparin. Given age, comorbid conditions and overall functional status little to offer from cardiac standpoint except supportive care. Will follow. Assessment:     - Concern for sepsis with hypotension and tachycardia. Now on pressors.  - Tachycardia with possible afib/flutter/atrial tach  - Echo with EF 50% 01/19/15, preliminary echo read this admission with severely reduced EF  - ESRD on dialysis, but held yesterday due to hypotension  - Bilateral lower extremity ischemia   - Lactic acidosis  - Altered mental status     Plan:     - Will continue with Amiodarone drip for possible afib  - Started Heparin drip after discussion with Dr. Roz Kim due to lower extremity ischemia. Will cover for afib as well. - Echocardiogram completed and full report to follow  - Supportive care and continue Amio from cardiology standpoint. Nephrology and palliative care consult also pending     History of Present Illness: This is a 80 y.o. male admitted for Hypotension  Atrial fibrillation with RVR ([address] Obtained history from medical record due to patient with altered mental status. 80year old male with a history of ESRD on dialysis presented to [facility] due to hypotension. He was unable to undergo HD.  He was also found to be in afib in the ED and was given Cardizem which dropped his BP. He was switched to Amiodarone gtt. He is now requiring pressor support and has findings of bilateral lower extremity ischemia and Doppler was unable to confirm pulses. Cardiac risk factors: male gender, unknown additional risk factors    Review of systems not obtained due to patient factors. Past Medical History:     Past Medical History   Diagnosis Date    Acute gout     Blind right eye     Cardiomyopathy ([address]     Gout     HTN (hypertension)     Hyperlipemia     Kidney disease          Social History:     Social History     Social History    Marital status:      Spouse name: N/A    Number of children: N/A    Years of education: N/A     Social History Main Topics    Smoking status: Former Smoker    Smokeless tobacco: Not on file    Alcohol use No    Drug use: No    Sexual activity: Not on file     Other Topics Concern    Not on file     Social History Narrative        Family History:   No family history on file.      Medications:   No Known Allergies     Current Facility-Administered Medications   Medication Dose Route Frequency    sodium chloride (NS) flush 5-10 mL  5-10 mL IntraVENous Q8H    sodium chloride (NS) flush 5-10 mL  5-10 mL IntraVENous PRN    allopurinol (ZYLOPRIM) tablet 300 mg  300 mg Oral DAILY    cinacalcet (SENSIPAR) tablet 30 mg  30 mg Oral DAILY    [START ON 1/4/2017] levothyroxine (SYNTHROID) tablet 50 mcg  50 mcg Oral ACB    simvastatin (ZOCOR) tablet 40 mg  40 mg Oral QHS    calcitRIOL (ROCALTROL) capsule 0.25 mcg  0.25 mcg Oral DAILY    cholecalciferol (VITAMIN D3) tablet 3,000 Units  3,000 Units Oral DAILY    amiodarone (NEXTERONE) 360 mg in dextrose 200 mL (1.8 mg/mL) infusion  0.5-1 mg/min IntraVENous TITRATE    pantoprazole (PROTONIX) 40 mg in sodium chloride 0.9 % 10 mL injection  40 mg IntraVENous DAILY    heparin 25,000 units in D5W 250 ml infusion  18 Units/kg/hr IntraVENous TITRATE    heparin 25,000 units in D5W 250 ml 25,000 unit/250 mL(100 unit/mL) infusion        piperacillin-tazobactam (ZOSYN) 2.25 g in 0.9% sodium chloride (MBP/ADV) 50 mL MBP  2.25 g IntraVENous Q8H    levoFLOXacin (LEVAQUIN) 500 mg in D5W IVPB  500 mg IntraVENous Q48H    NOREPINephrine (LEVOPHED) 8,000 mcg in dextrose 5% 250 mL infusion  2-30 mcg/min IntraVENous TITRATE         Physical Exam:     Visit Vitals    BP 92/69    Pulse (!) 111    Temp 97.4 °F (36.3 °C)    Resp 25    Ht 5' 9\" (1.753 m)    Wt 115 lb 1.3 oz (52.2 kg)    SpO2 94%    BMI 16.99 kg/m2     BP Readings from Last 3 Encounters:   01/03/17 92/69   09/12/16 117/67   08/23/16 117/55     Pulse Readings from Last 3 Encounters:   01/03/17 (!) 111   09/12/16 86   08/23/16 78     Wt Readings from Last 3 Encounters:   01/03/17 115 lb 1.3 oz (52.2 kg)   09/11/16 121 lb 3.2 oz (55 kg)   08/23/16 118 lb (53.5 kg)       General:  slowed mentation, disoriented, no distress  Neck:  nontender, no JVD  Lungs:  clear to auscultation bilaterally  Heart:  irregularly irregular rhythm  Abdomen:  abdomen is soft without significant tenderness, masses, organomegaly or guarding  Extremities:  Bilateral LE's cool, negative pulses  Skin: open sores bilateral LE's, skin cool   Neuro: no focal neurological deficits  Psych: unable to assess     Data Review:     Recent Labs      01/03/17   0330  01/02/17   1650   WBC  8.7  7.0   HGB  9.1*  10.0*   HCT  26.6*  29.4*   PLT  229  223     Recent Labs      01/03/17   0330  01/02/17   1650   NA  138  138   K  3.7  3.9   CL  99*  98*   CO2  21  28   GLU  100*  90   BUN  76*  76*   CREA  4.81*  4.79*   CA  9.8  10.9*   MG  2.3  2.5*   ALB   --   2.9*   SGOT   --   33   ALT   --   27   INR   --   1.4*       Results for orders placed or performed during the hospital encounter of 01/02/17   EKG, 12 LEAD, INITIAL   Result Value Ref Range    Ventricular Rate 146 BPM    Atrial Rate 141 BPM    QRS Duration 100 ms    Q-T Interval 316 ms    QTC Calculation (Bezet) 492 ms    Calculated R Axis 96 degrees    Calculated T Axis -102 degrees    Diagnosis       Undetermined rhythm  Rightward axis  Low voltage QRS  Septal infarct , age undetermined  ST & T wave abnormality, consider inferolateral ischemia  Abnormal ECG  When compared with ECG of 06-SEP-2016 15:38,  Current undetermined rhythm precludes rhythm comparison, needs review  Septal infarct is now present         All Cardiac Markers in the last 24 hours:    Lab Results   Component Value Date/Time     01/03/2017 06:28 AM    CPK 98 01/02/2017 11:00 PM     01/02/2017 04:50 PM    CKMB 6.7 (H) 01/03/2017 06:28 AM    CKMB 4.0 (H) 01/02/2017 11:00 PM    CKMB 3.8 (H) 01/02/2017 04:50 PM    CKND1 5.3 (H) 01/03/2017 06:28 AM    CKND1 4.1 (H) 01/02/2017 11:00 PM    CKND1 3.8 01/02/2017 04:50 PM    TROIQ 0.89 (H) 01/03/2017 06:28 AM    TROIQ 0.69 (H) 01/02/2017 11:00 PM    TROIQ 0.65 (H) 01/02/2017 04:50 PM       Last Lipid:    Lab Results   Component Value Date/Time    Cholesterol, total 163 01/18/2015 05:30 AM    HDL Cholesterol 77 01/18/2015 05:30 AM    LDL, calculated 73 01/18/2015 05:30 AM    Triglyceride 65 01/18/2015 05:30 AM    CHOL/HDL Ratio 2.1 01/18/2015 05:30 AM       Signed By: Maxim Suresh.  Fang Phillips     January 3, 2017

## 2017-01-03 NOTE — PROGRESS NOTES
[facility] Dosing Services     Pharmacy adjusting dose for Piperacillin-Tazobactam (Zosyn) per renal and extended infusion protocol. Was on a regimen of: 2.25 gm IV q8h    Labs:   Serum Creatinine/CrCl: 4.81 mg/dl/7 ml/min    Plan:  Changed Zosyn to 3.375 gm IV q12h extended 4 hours infusion. Pharmacy to follow and will redose based on renal function changes. Thanks.

## 2017-01-03 NOTE — CONSULTS
Vascular Surgery Consultation  [facility]    Subjective:   81 y/o male admitted last PM for decrease MS and hypotension. Found to have RVR A-fib placed on cardizem and now amiodaron. Also Levophed continues for hypotension. H/o ESRD on HD ~3 months, MWF (nephrologist Dr. Gareth Rey). Significant wt loss over the last ~2-3 months / decreased appetite. Per daughter has blackened toes for 4-6 wks. Minimally ambulatory. Pt denies abd or leg/foot pain currently. On exam 8/2016 decreased bilateral pedal pulses. Patient Active Problem List    Diagnosis Date Noted    Shock ([address] 01/03/2017    Lactic acidosis 01/03/2017    Hypotension 01/02/2017    Atrial fibrillation with RVR ([address] 01/02/2017    Sepsis ([address] 01/02/2017    Generalized weakness 09/06/2016    Acute on chronic renal failure (HCC) 09/06/2016    Multiple falls 09/06/2016    CKD (chronic kidney disease) stage 5, GFR less than 15 ml/min ([address] 08/23/2016    Hypertension 01/18/2015    Cardiomyopathy ([address] 01/18/2015    Gout 01/18/2015    Syncope and collapse 01/17/2015     Past Medical History   Diagnosis Date    Acute gout     Blind right eye     Cardiomyopathy ([address]     ESRD on hemodialysis (HCC)      [facility], [facility] Client Dr. Huy zepeda Gout     HTN (hypertension)     Hyperlipemia       Past Surgical History   Procedure Laterality Date    Hx hernia repair      L upper arm AVG 8/23/16 [facility])    Social History   Substance Use Topics    Smoking status: Former Smoker    Smokeless tobacco: Not on file    Alcohol use No      No family history on file. Prior to Admission medications    Medication Sig Start Date End Date Taking? Authorizing Provider   calcitRIOL (ROCALTROL) 0.25 mcg capsule Take 0.25 mcg by mouth daily. Historical Provider   Cholecalciferol, Vitamin D3, 3,000 unit tab Take  by mouth. Historical Provider   cinacalcet (SENSIPAR) 30 mg tablet Take 30 mg by mouth daily.     Historical Provider levothyroxine (SYNTHROID) 50 mcg tablet Take  by mouth Daily (before breakfast). Historical Provider   allopurinol (ZYLOPRIM) 300 mg tablet 300 mg. Filippo Muir MD   simvastatin (ZOCOR) 40 mg tablet 40 mg. Phys MD Wood     No Known Allergies      Review of Systems:   Gen -no  fever / chills,  Decreased appetite and wt loss. HEENT - blind R eye, and cataract surgery 2016 L eye. PULM - no cough/dyspnea  CV - denies chest pain, EF today preliminary Echo 25%. (down from ~50%)   GI - no abd pain, no n/v, no diarrhea/constipation, no blood per rectum   - decreased urine output - ESRD  SKIN - per daughter - blackened bilateral 2nd toes and tender heel areas. MS - + arthritis. Objective:     Visit Vitals    BP 92/69    Pulse (!) 111    Temp 97.4 °F (36.3 °C)    Resp 25    Ht 5' 9\" (1.753 m)    Wt 52.2 kg (115 lb 1.3 oz)    SpO2 94%    BMI 16.99 kg/m2       Temp (24hrs), Av.4 °F (36.3 °C), Min:96.8 °F (36 °C), Max:98.1 °F (36.7 °C)      Physical Exam:  GEN: NAD,. Awakens easily to answer questions when daughter repeats. HEENT:R eye ptosis/obaque cornea,   NECK: + JVD, no carotid bruit, no LA, no TM  LUNG: clear b/l and unlabored breathing, decreased breath sounds bases  HEART: irregular w/o murmur noted  ABD: soft, NT, no masses palpable, scaphoid abdomen - significant wt loss over last 3 months since starting HD  EXT: no edema, L Upper ARm - soft thrill  + bruit L upper arm AVG  PULSES: palpable femoral pulses, non palpable pedal/pop pulses, both feet coot with blackened ends of both 2nd toes. Heels tender, L lateral heel/ankle small breakdown  NEURO: answers with Ok to questions. Not moving extremities much at all. Generalized weakness.      Labs:   Recent Results (from the past 24 hour(s))   EKG, 12 LEAD, INITIAL    Collection Time: 17  4:29 PM   Result Value Ref Range    Ventricular Rate 146 BPM    Atrial Rate 141 BPM    QRS Duration 100 ms    Q-T Interval 316 ms    QTC Calculation (Bezet) 492 ms    Calculated R Axis 96 degrees    Calculated T Axis -102 degrees    Diagnosis       Undetermined rhythm  Rightward axis  Low voltage QRS  Septal infarct , age undetermined  ST & T wave abnormality, consider inferolateral ischemia  Abnormal ECG  When compared with ECG of 06-SEP-2016 15:38,  Current undetermined rhythm precludes rhythm comparison, needs review  Septal infarct is now present     PROTHROMBIN TIME + INR    Collection Time: 01/02/17  4:50 PM   Result Value Ref Range    Prothrombin time 16.7 (H) 11.5 - 15.2 sec    INR 1.4 (H) 0.8 - 1.2     PTT    Collection Time: 01/02/17  4:50 PM   Result Value Ref Range    aPTT 33.3 23.0 - 36.4 SEC   CBC WITH AUTOMATED DIFF    Collection Time: 01/02/17  4:50 PM   Result Value Ref Range    WBC 7.0 4.6 - 13.2 K/uL    RBC 3.12 (L) 4.70 - 5.50 M/uL    HGB 10.0 (L) 13.0 - 16.0 g/dL    HCT 29.4 (L) 36.0 - 48.0 %    MCV 94.2 74.0 - 97.0 FL    MCH 32.1 24.0 - 34.0 PG    MCHC 34.0 31.0 - 37.0 g/dL    RDW 14.6 (H) 11.6 - 14.5 %    PLATELET 918 458 - 323 K/uL    MPV 9.7 9.2 - 11.8 FL    NEUTROPHILS 79 (H) 40 - 73 %    LYMPHOCYTES 14 (L) 21 - 52 %    MONOCYTES 7 3 - 10 %    EOSINOPHILS 0 0 - 5 %    BASOPHILS 0 0 - 2 %    ABS. NEUTROPHILS 5.5 1.8 - 8.0 K/UL    ABS. LYMPHOCYTES 1.0 0.9 - 3.6 K/UL    ABS. MONOCYTES 0.5 0.05 - 1.2 K/UL    ABS. EOSINOPHILS 0.0 0.0 - 0.4 K/UL    ABS.  BASOPHILS 0.0 0.0 - 0.06 K/UL    DF AUTOMATED     METABOLIC PANEL, BASIC    Collection Time: 01/02/17  4:50 PM   Result Value Ref Range    Sodium 138 136 - 145 mmol/L    Potassium 3.9 3.5 - 5.5 mmol/L    Chloride 98 (L) 100 - 108 mmol/L    CO2 28 21 - 32 mmol/L    Anion gap 12 3.0 - 18 mmol/L    Glucose 90 74 - 99 mg/dL    BUN 76 (H) 7.0 - 18 MG/DL    Creatinine 4.79 (H) 0.6 - 1.3 MG/DL    BUN/Creatinine ratio 16 12 - 20      GFR est AA 14 (L) >60 ml/min/1.73m2    GFR est non-AA 11 (L) >60 ml/min/1.73m2    Calcium 10.9 (H) 8.5 - 10.1 MG/DL   CARDIAC PANEL,(CK, CKMB & TROPONIN) Collection Time: 01/02/17  4:50 PM   Result Value Ref Range     39 - 308 U/L    CK - MB 3.8 (H) 0.5 - 3.6 ng/ml    CK-MB Index 3.8 0.0 - 4.0 %    Troponin-I, Qt. 0.65 (H) 0.0 - 0.045 NG/ML   LIPASE    Collection Time: 01/02/17  4:50 PM   Result Value Ref Range    Lipase 185 73 - 393 U/L   HEPATIC FUNCTION PANEL    Collection Time: 01/02/17  4:50 PM   Result Value Ref Range    Protein, total 7.4 6.4 - 8.2 g/dL    Albumin 2.9 (L) 3.4 - 5.0 g/dL    Globulin 4.5 (H) 2.0 - 4.0 g/dL    A-G Ratio 0.6 (L) 0.8 - 1.7      Bilirubin, total 0.7 0.2 - 1.0 MG/DL    Bilirubin, direct 0.3 (H) 0.0 - 0.2 MG/DL    Alk.  phosphatase 217 (H) 45 - 117 U/L    AST 33 15 - 37 U/L    ALT 27 16 - 61 U/L   MAGNESIUM    Collection Time: 01/02/17  4:50 PM   Result Value Ref Range    Magnesium 2.5 (H) 1.8 - 2.4 mg/dL   POC LACTIC ACID    Collection Time: 01/02/17  4:52 PM   Result Value Ref Range    Lactic Acid (POC) 2.2 (HH) 0.4 - 2.0 mmol/L   CULTURE, BLOOD    Collection Time: 01/02/17  8:55 PM   Result Value Ref Range    Special Requests: PORT      Culture result: NO GROWTH AFTER 11 HOURS     CULTURE, BLOOD    Collection Time: 01/02/17  9:05 PM   Result Value Ref Range    Special Requests: NO SPECIAL REQUESTS      Culture result: NO GROWTH AFTER 11 HOURS     CULTURE, BLOOD    Collection Time: 01/02/17  9:20 PM   Result Value Ref Range    Special Requests: NO SPECIAL REQUESTS      Culture result: NO GROWTH AFTER 11 HOURS     POC LACTIC ACID    Collection Time: 01/02/17  9:25 PM   Result Value Ref Range    Lactic Acid (POC) 2.9 (HH) 0.4 - 2.0 mmol/L   URINALYSIS W/ RFLX MICROSCOPIC    Collection Time: 01/02/17  9:25 PM   Result Value Ref Range    Color DARK YELLOW      Appearance CLOUDY      Specific gravity 1.019 1.005 - 1.030      pH (UA) 5.0 5.0 - 8.0      Protein 100 (A) NEG mg/dL    Glucose NEGATIVE  NEG mg/dL    Ketone TRACE (A) NEG mg/dL    Bilirubin NEGATIVE  NEG      Blood NEGATIVE  NEG      Urobilinogen 0.2 0.2 - 1.0 EU/dL Nitrites NEGATIVE  NEG      Leukocyte Esterase SMALL (A) NEG     URINE MICROSCOPIC ONLY    Collection Time: 01/02/17  9:25 PM   Result Value Ref Range    WBC 4 to 10 0 - 4 /hpf    RBC NEGATIVE  0 - 5 /hpf    Epithelial cells 1+ 0 - 5 /lpf    Bacteria FEW (A) NEG /hpf    Amorphous Crystals 1+ (A) NEG    Mixed Cell Cast 0 to 3 NEG /LPF   CARDIAC PANEL,(CK, CKMB & TROPONIN)    Collection Time: 01/02/17 11:00 PM   Result Value Ref Range    CK 98 39 - 308 U/L    CK - MB 4.0 (H) 0.5 - 3.6 ng/ml    CK-MB Index 4.1 (H) 0.0 - 4.0 %    Troponin-I, Qt. 0.69 (H) 0.0 - 0.045 NG/ML   TSH 3RD GENERATION    Collection Time: 01/03/17  3:30 AM   Result Value Ref Range    TSH 4.66 (H) 0.36 - 1.45 uIU/mL   METABOLIC PANEL, BASIC    Collection Time: 01/03/17  3:30 AM   Result Value Ref Range    Sodium 138 136 - 145 mmol/L    Potassium 3.7 3.5 - 5.5 mmol/L    Chloride 99 (L) 100 - 108 mmol/L    CO2 21 21 - 32 mmol/L    Anion gap 18 3.0 - 18 mmol/L    Glucose 100 (H) 74 - 99 mg/dL    BUN 76 (H) 7.0 - 18 MG/DL    Creatinine 4.81 (H) 0.6 - 1.3 MG/DL    BUN/Creatinine ratio 16 12 - 20      GFR est AA 14 (L) >60 ml/min/1.73m2    GFR est non-AA 11 (L) >60 ml/min/1.73m2    Calcium 9.8 8.5 - 10.1 MG/DL   MAGNESIUM    Collection Time: 01/03/17  3:30 AM   Result Value Ref Range    Magnesium 2.3 1.8 - 2.4 mg/dL   CBC WITH AUTOMATED DIFF    Collection Time: 01/03/17  3:30 AM   Result Value Ref Range    WBC 8.7 4.6 - 13.2 K/uL    RBC 2.79 (L) 4.70 - 5.50 M/uL    HGB 9.1 (L) 13.0 - 16.0 g/dL    HCT 26.6 (L) 36.0 - 48.0 %    MCV 95.3 74.0 - 97.0 FL    MCH 32.6 24.0 - 34.0 PG    MCHC 34.2 31.0 - 37.0 g/dL    RDW 14.8 (H) 11.6 - 14.5 %    PLATELET 088 666 - 309 K/uL    MPV 10.2 9.2 - 11.8 FL    NEUTROPHILS 87 (H) 40 - 73 %    LYMPHOCYTES 8 (L) 21 - 52 %    MONOCYTES 5 3 - 10 %    EOSINOPHILS 0 0 - 5 %    BASOPHILS 0 0 - 2 %    ABS. NEUTROPHILS 7.6 1.8 - 8.0 K/UL    ABS. LYMPHOCYTES 0.7 (L) 0.9 - 3.6 K/UL    ABS. MONOCYTES 0.5 0.05 - 1.2 K/UL    ABS. EOSINOPHILS 0.0 0.0 - 0.4 K/UL    ABS.  BASOPHILS 0.0 0.0 - 0.06 K/UL    DF AUTOMATED     GLUCOSE, POC    Collection Time: 01/03/17  3:35 AM   Result Value Ref Range    Glucose (POC) 113 (H) 70 - 110 mg/dL   POC LACTIC ACID    Collection Time: 01/03/17  3:37 AM   Result Value Ref Range    Lactic Acid (POC) 6.9 (HH) 0.4 - 2.0 mmol/L   POC LACTIC ACID    Collection Time: 01/03/17  3:45 AM   Result Value Ref Range    Lactic Acid (POC) 6.8 (HH) 0.4 - 2.0 mmol/L   CARDIAC PANEL,(CK, CKMB & TROPONIN)    Collection Time: 01/03/17  6:28 AM   Result Value Ref Range     39 - 308 U/L    CK - MB 6.7 (H) 0.5 - 3.6 ng/ml    CK-MB Index 5.3 (H) 0.0 - 4.0 %    Troponin-I, Qt. 0.89 (H) 0.0 - 0.045 NG/ML   LACTIC ACID, PLASMA    Collection Time: 01/03/17  8:00 AM   Result Value Ref Range    Lactic acid 9.4 (HH) 0.4 - 2.0 MMOL/L   GLUCOSE, POC    Collection Time: 01/03/17 10:06 AM   Result Value Ref Range    Glucose (POC) 122 (H) 70 - 110 mg/dL   EKG, 12 LEAD, SUBSEQUENT    Collection Time: 01/03/17 11:54 AM   Result Value Ref Range    Ventricular Rate 112 BPM    Atrial Rate 357 BPM    QRS Duration 108 ms    Q-T Interval 380 ms    QTC Calculation (Bezet) 518 ms    Calculated R Axis 92 degrees    Calculated T Axis -123 degrees    Diagnosis       Atrial fibrillation with rapid ventricular response  Rightward axis  Low voltage QRS  Septal infarct (cited on or before 02-JAN-2017)  Abnormal ECG  When compared with ECG of 02-JAN-2017 16:29,  Previous ECG has undetermined rhythm, needs review  Nonspecific T wave abnormality has replaced inverted T waves in Inferior   leads           Assessment:     Principal Problem:    Shock ([address] (1/3/2017)    Active Problems:    Hypotension (1/2/2017)      Atrial fibrillation with RVR ([address] (1/2/2017)      Sepsis ([facility] (1/2/2017)      Lactic acidosis (1/3/2017)        Plan:   ESRD on HD - held due to hypotension, receiving levophed to maintain BP  PVD - progression of PVD over the last several months. Heparin drip started for now, when stable consider aortogram Betsy Moreno. Following alone. A-fib/RVR - amiodarone  ?failure to thrive since starting hemodialysis - wt loss, cachetic appearing, consider palliative considering age, medical problems. Lactic acidosis - no abd pain, LE have poor arterial flow / but do not appear grossly ischemic or infected. No evidence of L upper arm AVG infection. Mary Skinner.  Sharee Cantu, [facility]  [facility] - [phone]  January 3, 2017  1:22 PM

## 2017-01-03 NOTE — ED NOTES
Purposeful rounding completed:    Side rails up x 2:  YES  Bed in low position and wheels locked: YES  Call bell within reach: YES  Comfort addressed: YES    Toileting needs addressed: YES  Plan of care reviewed/updated with patient and or family members: YES  IV site assessed: YES  Pain assessed and addressed: YES  Incontinent care rendered, linens changed. Patient repositioned in bed.

## 2017-01-03 NOTE — PROGRESS NOTES
[facility] Dosing Services    Consult Per P&T Protocol for Vancomycin therapy  Indication: Sepsis of Unknown Etiology  Goal Level(s): 10-20 mcg/ml  Dialysis Patient    80 y.o., male   Height / Weight:     Ht Readings from Last 1 Encounters:   01/02/17 175.3 cm (69\")        Wt Readings from Last 1 Encounters:   01/02/17 52.2 kg (115 lb)      Temp: 98.1 °F (36.7 °C)    Serum Creatinine:   Lab Results   Component Value Date/Time    Creatinine 4.79 01/02/2017 04:50 PM     Creatinine Clearance:  Estimated Creatinine Clearance: 7.1 mL/min (based on Cr of 4.79). BUN:    Lab Results   Component Value Date/Time    BUN 76 01/02/2017 04:50 PM      WBC / C&S:    Lab Results   Component Value Date/Time    WBC 7.0 01/02/2017 04:50 PM    Culture result:  12/19/2010 10:00 AM     ANAEROBIC BOTTLE ESCHERICHIA COLI  For Susceptibility Refer to Culture [id number]       Other Current Antibiotics:    Current Antimicrobial Therapy (168h ago through future)      Ordered     Start Stop      01/02/17 2059  vancomycin (VANCOCIN) 1,000 mg in 0.9% sodium chloride (MBP/ADV) 250 mL adv  1,000 mg,   IntraVENous,   ONCE      01/02/17 2300 01/03/17 1059    01/02/17 2045  levoFLOXacin (LEVAQUIN) 500 mg in D5W IVPB  500 mg,   IntraVENous,   EVERY 48 HOURS      01/02/17 2045 --    01/02/17 2045  piperacillin-tazobactam (ZOSYN) 2.25 g in 0.9% sodium chloride (MBP/ADV) 50 mL MBP  2.25 g,   IntraVENous,   EVERY 8 HOURS      01/02/17 2044 --              Initiate therapy with Vancomycin IV 1 gram for one dose tonight after the first doses of levofloxacin and Zosyn have been infused. Pharmacy will calculate the rest of the Vancomycin regimen once the Dialysis Schedule is determined. Pharmacy will follow daily and make changes to dose and/or frequency as needed. ARNOL Perez, Pharmacist  1/2/2017 9:01 PM

## 2017-01-03 NOTE — ED NOTES
Purposeful rounding completed:    Side rails up x 2:  YES  Bed in low position and wheels locked: YES  Call bell within reach: YES  Comfort addressed: YES    Toileting needs addressed: YES  Plan of care reviewed/updated with patient and or family members: YES  IV site assessed: YES  Pain assessed and addressed: YES. Patient repositioned, lines untangled.

## 2017-01-04 NOTE — PROGRESS NOTES
[facility] notified of death, spoke with Genevieve Osuna, [facility] coordinator, patient declined due to age.

## 2017-01-04 NOTE — PROGRESS NOTES
responded to the death of  Sergio Burnham, who was a 80 y. o.,male,     The  provided the following Interventions:  Provided crisis spiritual support and grief interventions. Offered prayers on behalf of the patient. Chart reviewed. Plan:  Chaplains will continue to follow and will provide spiritual care and grief support for the family.     [phone]  Pager:[phone]

## 2017-01-04 NOTE — DISCHARGE SUMMARY
Discharge Summary    Patient: Katherine Mckeon               Sex: male          DOA: 1/2/2017         YOB: 1923      Age:  80 y.o.        LOS:  LOS: 2 days                Admit Date: 1/2/2017    Discharge Date: 1/4/2017    Discharge Medications:     Discharge Medication List as of 1/4/2017  5:33 AM      CONTINUE these medications which have NOT CHANGED    Details   calcitRIOL (ROCALTROL) 0.25 mcg capsule Take 0.25 mcg by mouth daily. , Historical Med      Cholecalciferol, Vitamin D3, 3,000 unit tab Take  by mouth., Historical Med      cinacalcet (SENSIPAR) 30 mg tablet Take 30 mg by mouth daily. , Historical Med      levothyroxine (SYNTHROID) 50 mcg tablet Take  by mouth Daily (before breakfast). , Historical Med      allopurinol (ZYLOPRIM) 300 mg tablet 300 mg., Historical Med      simvastatin (ZOCOR) 40 mg tablet 40 mg., Historical Med           Patient pronounced dead early this am 240 am    Labs:  Labs: Results:       Chemistry Recent Labs      01/03/17   0330  01/02/17   1650   GLU  100*  90   NA  138  138   K  3.7  3.9   CL  99*  98*   CO2  21  28   BUN  76*  76*   CREA  4.81*  4.79*   CA  9.8  10.9*   AGAP  18  12   BUCR  16  16   AP   --   217*   TP   --   7.4   ALB   --   2.9*   GLOB   --   4.5*   AGRAT   --   0.6*      CBC w/Diff Recent Labs      01/03/17   0330  01/02/17   1650   WBC  8.7  7.0   RBC  2.79*  3.12*   HGB  9.1*  10.0*   HCT  26.6*  29.4*   PLT  229  223   GRANS  87*  79*   LYMPH  8*  14*   EOS  0  0      Cardiac Enzymes Recent Labs      01/03/17   0628  01/02/17   2300   CPK  126  98   CKND1  5.3*  4.1*      Coagulation Recent Labs      01/03/17   1710  01/02/17   1650   PTP   --   16.7*   INR   --   1.4*   APTT  >180.0*  33.3       Lipid Panel Lab Results   Component Value Date/Time    Cholesterol, total 163 01/18/2015 05:30 AM    HDL Cholesterol 77 01/18/2015 05:30 AM    LDL, calculated 73 01/18/2015 05:30 AM    VLDL, calculated 13 01/18/2015 05:30 AM    Triglyceride 65 01/18/2015 05:30 AM    CHOL/HDL Ratio 2.1 01/18/2015 05:30 AM      BNP No results for input(s): BNPP in the last 72 hours. Liver Enzymes Recent Labs      01/02/17   1650   TP  7.4   ALB  2.9*   AP  217*   SGOT  33      Thyroid Studies Lab Results   Component Value Date/Time    TSH 4.66 01/03/2017 03:30 AM            Consults: Cardiology, Pulmonary/Critical Care and Vascular Surgery    Treatment Team: Treatment Team: Consulting Provider: Ronel Duarte MD; Consulting Provider: Mitzy Forrest MD; Scribe: Lennox Garcia; Consulting Provider: Louis Hernandez MD; Care Manager: Blessing Escobar;  Consulting Provider: Nani Muse MD; Utilization Review: Ashley Guillaume    Significant Diagnostic Studies: labs:   Recent Results (from the past 24 hour(s))   GLUCOSE, POC    Collection Time: 01/03/17 10:06 AM   Result Value Ref Range    Glucose (POC) 122 (H) 70 - 110 mg/dL   EKG, 12 LEAD, SUBSEQUENT    Collection Time: 01/03/17 11:54 AM   Result Value Ref Range    Ventricular Rate 112 BPM    Atrial Rate 357 BPM    QRS Duration 108 ms    Q-T Interval 380 ms    QTC Calculation (Bezet) 518 ms    Calculated R Axis 92 degrees    Calculated T Axis -123 degrees    Diagnosis       Atrial fibrillation with rapid ventricular response  Rightward axis  Low voltage QRS  Septal infarct (cited on or before 02-JAN-2017)  Abnormal ECG  When compared with ECG of 02-JAN-2017 16:29,  Previous ECG has undetermined rhythm, needs review  Nonspecific T wave abnormality has replaced inverted T waves in Inferior   leads     LACTIC ACID, PLASMA    Collection Time: 01/03/17  2:40 PM   Result Value Ref Range    Lactic acid 12.5 (HH) 0.4 - 2.0 MMOL/L   PTT    Collection Time: 01/03/17  5:10 PM   Result Value Ref Range    aPTT >180.0 (HH) 23.0 - 36.4 SEC   LACTIC ACID, PLASMA    Collection Time: 01/03/17 10:00 PM   Result Value Ref Range    Lactic acid 13.6 (HH) 0.4 - 2.0 MMOL/L         Discharge diagnoses:    Problem List as of 1/4/2017 Date Reviewed: 8/23/2016          Codes Class Noted - Resolved    * (Principal)Shock ([facility] ICD-10-CM: R57.9  ICD-9-CM: 785.50  1/3/2017 - Present        Lactic acidosis ICD-10-CM: E87.2  ICD-9-CM: 276.2  1/3/2017 - Present        Hypotension ICD-10-CM: I95.9  ICD-9-CM: 458.9  1/2/2017 - Present        Atrial fibrillation with RVR (HCC) ICD-10-CM: I48.91  ICD-9-CM: 427.31  1/2/2017 - Present        Sepsis ([facility] ICD-10-CM: A41.9  ICD-9-CM: 038.9, 995.91  1/2/2017 - Present        Generalized weakness ICD-10-CM: R53.1  ICD-9-CM: 780.79  9/6/2016 - Present        Acute on chronic renal failure (HCC) ICD-10-CM: N17.9, N18.9  ICD-9-CM: 584.9, 585.9  9/6/2016 - Present        Multiple falls ICD-10-CM: R29.6  ICD-9-CM: V15.88  9/6/2016 - Present        CKD (chronic kidney disease) stage 5, GFR less than 15 ml/min (HCC) (Chronic) ICD-10-CM: N18.5  ICD-9-CM: 585.5  8/23/2016 - Present        Hypertension ICD-10-CM: I10  ICD-9-CM: 401.9  1/18/2015 - Present        Cardiomyopathy ([facility] ICD-10-CM: I42.9  ICD-9-CM: 425.4  1/18/2015 - Present    Overview Signed 1/18/2015  1:22 AM by Merlin Martínez     Last EF known from [address] of 40% in July of 2014              Gout ICD-10-CM: M10.9  ICD-9-CM: 274.9  1/18/2015 - Present        Syncope and collapse ICD-10-CM: R55  ICD-9-CM: 780.2  1/17/2015 - Present        RESOLVED: CKD (chronic kidney disease) stage 3, GFR 30-59 ml/min ICD-10-CM: N18.3  ICD-9-CM: 585.3  1/18/2015 - 8/23/2016        RESOLVED: Laceration of head ICD-10-CM: S01. 91XA  ICD-9-CM: 873.8  1/18/2015 - 8/23/2016            Hospital Course:   Major issues addressed during hospitalization outlined  below. Kanwal Akhtar is a 80 y.o. male who presented in ED for evaluation of hypotension. Per ED physician patient was at the dialysis center today when it was noted he was hypotensive. He had not yet had the dialysis. In the ED he was noted to have tachycardia and hypotension systolic in the low 51'U.  He was was initially given IV NS 1500 cc total. He also was given bolus Cardizem and subsequently switched to amiodarone bolus and then infusion on the recommendation of cardiology. Troponin is elevated. Patient also had elevated lactic acid. Patient was not started on antibiotics. Patient will be admitted for further treatment    Patient came in with afib RVR seen by cards and placed on amio. Also likely sepsis of unknown source at least initially placed on broad abx. Respiratory wise patient was doing ok initially however mental state was confused. Nephrololgy was called for ESRD for HD unfortunately patient went to ICU and was on pressors and was unable to be dialyzed. Intensivist was consulted and signed off. Patient worsened late the night after admission. He became even more hypotensive and breathing became difficult. Patient was made DNR on that night after speaking to family. Source of infection was not known however patient was noted to have very cold legs on day after admission. Vascular was called and planned future imaging when improved. They spoke to family. 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 He succumbed to illness at 240 am 1/4.     Moise Marti MD  January 4, 2017        Total time spent 40 minutes

## 2017-01-04 NOTE — PROGRESS NOTES
Called by nursing to see patient regarding unresponsiveness. The patient was found to be breathless, pulseless, and without heart tones, blood pressure, and corneal reflexes. The patient was pronounced dead at 240 am on 1/04/17. The patient's family has been notified.

## 2017-01-04 NOTE — PROGRESS NOTES
1 - Report received from [facility], [address] [place] [address]. Orders, medications, and labs received. 2000 - Unable to obtain oral or axillary temperature on pt. Rectal temp resulted at 90F (32.2C). Nirmal Hugger applied to pt and rectal temperature probe inserted. 2006 - Heparin drip stopped for PTT > 180 per order. 2106 - Heparin drip restarted at 15 Units/kg/hr per order. 0100 - Levophed rate increased to 5 mcg/min for SBP < 90.  0200 - Levophed rate increased to 10 mcg/min for SBP < 90.  0229 - Levophed rate increased to 20 mcg/min for SBP < 90.  5731 - Pt asystolic. Due to DNR status, no interventions performed. Time of death recorded as [phone] by Dr. Liane Odonnell. [facility] and family notified.

## 2017-01-04 NOTE — PROGRESS NOTES
Problem: Sepsis: Day of Diagnosis  Goal: Nutrition/Diet  Outcome: Not Met  Pt NPO    Problem: Afib Pathway: Day 1  Goal: Activity/Safety  Outcome: Not Met  Bedrest  Goal: *Hemodynamically stable  Outcome: Not Met  Unstable  Goal: *Stable cardiac rhythm  Outcome: Not Met  A fib/irregular  Goal: *Labs within defined limits  Outcome: Not Met  Lactic Acid elevated  Goal: *Describes available resources and support systems  Outcome: Not Met  Pt unable to verbalize

## 2017-01-05 LAB
ATRIAL RATE: 357 BPM
BACTERIA SPEC CULT: NORMAL
BACTERIA SPEC CULT: NORMAL
CALCULATED R AXIS, ECG10: 92 DEGREES
CALCULATED T AXIS, ECG11: -123 DEGREES
DIAGNOSIS, 93000: NORMAL
GRAM STN SPEC: NORMAL
GRAM STN SPEC: NORMAL
Q-T INTERVAL, ECG07: 380 MS
QRS DURATION, ECG06: 108 MS
QTC CALCULATION (BEZET), ECG08: 518 MS
SERVICE CMNT-IMP: NORMAL
SERVICE CMNT-IMP: NORMAL
VENTRICULAR RATE, ECG03: 112 BPM

## 2017-01-08 LAB
BACTERIA SPEC CULT: NORMAL
BACTERIA SPEC CULT: NORMAL
SERVICE CMNT-IMP: NORMAL
SERVICE CMNT-IMP: NORMAL

## 2017-01-09 LAB
BACTERIA SPEC CULT: NORMAL
SERVICE CMNT-IMP: NORMAL

## 2024-03-14 NOTE — PROGRESS NOTES
ICU AM Rounds/ Multidisciplinary Rounds  (Daily ICU Progress Note to Follow)    81 yo with ESRD on IHD 3x/week since Sep 2016. Presently in no distress with MMP. Has thin minor bleeding at urinary meatus; on heparin for Afib, ? DVT and severe PVD with no DP pulses & gengrene. Risk of bleeding outweighed by potential benefit of heparin for now. Without clear acute ICU issues right now, PCCM involvement has potential to complicate decision making. The low dose norepinephrine is at discretion of other consultants. He is being followed by hospitalist, nephrologist, cardiologist, vascular surgeon. PCCM will sign off but remain available as needed.     Please call for any questions or concerns.     -[phone]  [phone] no